# Patient Record
Sex: MALE | Race: BLACK OR AFRICAN AMERICAN | NOT HISPANIC OR LATINO | Employment: OTHER | URBAN - METROPOLITAN AREA
[De-identification: names, ages, dates, MRNs, and addresses within clinical notes are randomized per-mention and may not be internally consistent; named-entity substitution may affect disease eponyms.]

---

## 2017-05-01 ENCOUNTER — HISTORICAL (OUTPATIENT)
Dept: RADIOLOGY | Facility: HOSPITAL | Age: 72
End: 2017-05-01

## 2017-05-23 ENCOUNTER — HISTORICAL (OUTPATIENT)
Dept: RADIOLOGY | Facility: HOSPITAL | Age: 72
End: 2017-05-23

## 2017-09-29 ENCOUNTER — HISTORICAL (OUTPATIENT)
Dept: RADIOLOGY | Facility: HOSPITAL | Age: 72
End: 2017-09-29

## 2017-10-16 ENCOUNTER — HISTORICAL (OUTPATIENT)
Dept: SURGERY | Facility: HOSPITAL | Age: 72
End: 2017-10-16

## 2017-10-16 LAB
ABS NEUT (OLG): 4.4 X10(3)/MCL (ref 1.5–6.9)
APPEARANCE, UA: CLEAR
APTT PPP: 26.9 SECOND(S) (ref 25–35)
BACTERIA SPEC CULT: NORMAL /HPF
BILIRUB UR QL STRIP: NEGATIVE
BUN SERPL-MCNC: 16 MG/DL (ref 10–20)
CALCIUM SERPL-MCNC: 9.2 MG/DL (ref 8–10.5)
CHLORIDE SERPL-SCNC: 107 MMOL/L (ref 100–108)
CO2 SERPL-SCNC: 27 MMOL/L (ref 21–35)
COLOR UR: ABNORMAL
CREAT SERPL-MCNC: 1.09 MG/DL (ref 0.7–1.3)
CRP SERPL-MCNC: <0.2 MG/DL (ref 0–0.9)
ERYTHROCYTE [DISTWIDTH] IN BLOOD BY AUTOMATED COUNT: 14.1 % (ref 11.5–17)
ERYTHROCYTE [SEDIMENTATION RATE] IN BLOOD: 9 MM/HR (ref 0–15)
GLUCOSE (UA): NEGATIVE
GLUCOSE SERPL-MCNC: 104 MG/DL (ref 75–116)
HCT VFR BLD AUTO: 34.1 % (ref 42–52)
HGB BLD-MCNC: 11.4 GM/DL (ref 14–18)
HGB UR QL STRIP: ABNORMAL
INR PPP: 1 (ref 0–1.2)
KETONES UR QL STRIP: NEGATIVE
LEUKOCYTE ESTERASE UR QL STRIP: NEGATIVE
MCH RBC QN AUTO: 30 PG (ref 27–34)
MCHC RBC AUTO-ENTMCNC: 33 GM/DL (ref 31–36)
MCV RBC AUTO: 91 FL (ref 80–99)
MRSA SCREEN BY PCR: POSITIVE
NITRITE UR QL STRIP: NEGATIVE
PH UR STRIP: 5 [PH]
PLATELET # BLD AUTO: 264 X10(3)/MCL (ref 140–400)
PMV BLD AUTO: 10.6 FL (ref 6.8–10)
POTASSIUM SERPL-SCNC: 3.8 MMOL/L (ref 3.6–5.2)
PROT UR QL STRIP: NEGATIVE
PROTHROMBIN TIME: 10.9 SECOND(S) (ref 9–12)
RBC # BLD AUTO: 3.75 X10(6)/MCL (ref 4.7–6.1)
RBC #/AREA URNS HPF: NORMAL /HPF
SODIUM SERPL-SCNC: 141 MMOL/L (ref 135–145)
SP GR UR STRIP: 1.01
SQUAMOUS EPITHELIAL, UA: NORMAL
UROBILINOGEN UR STRIP-ACNC: 0.2 EU/DL
WBC # SPEC AUTO: 6.4 X10(3)/MCL (ref 4.5–11.5)
WBC #/AREA URNS HPF: NORMAL /[HPF]

## 2017-10-31 ENCOUNTER — HISTORICAL (OUTPATIENT)
Dept: RADIOLOGY | Facility: HOSPITAL | Age: 72
End: 2017-10-31

## 2018-01-19 ENCOUNTER — HISTORICAL (OUTPATIENT)
Dept: LAB | Facility: HOSPITAL | Age: 73
End: 2018-01-19

## 2018-01-19 LAB
COLOR STL: ABNORMAL
COLOR STL: NORMAL
CONSISTENCY STL: ABNORMAL
CONSISTENCY STL: NORMAL
HEMOCCULT SP1 STL QL: NEGATIVE
HEMOCCULT SP2 STL QL: POSITIVE

## 2018-01-29 ENCOUNTER — HISTORICAL (OUTPATIENT)
Dept: LAB | Facility: HOSPITAL | Age: 73
End: 2018-01-29

## 2018-01-29 LAB
ABS NEUT (OLG): 3.6 X10(3)/MCL (ref 1.5–6.9)
COLOR STL: NORMAL
COLOR STL: NORMAL
COLOR STL: YELLOW
CONSISTENCY STL: NORMAL
ERYTHROCYTE [DISTWIDTH] IN BLOOD BY AUTOMATED COUNT: 13.6 % (ref 11.5–17)
HCT VFR BLD AUTO: 34.3 % (ref 42–52)
HEMOCCULT SP1 STL QL: NEGATIVE
HEMOCCULT SP2 STL QL: NEGATIVE
HGB BLD-MCNC: 11.4 GM/DL (ref 14–18)
MCH RBC QN AUTO: 30 PG (ref 27–34)
MCHC RBC AUTO-ENTMCNC: 33 GM/DL (ref 31–36)
MCV RBC AUTO: 90 FL (ref 80–99)
PLATELET # BLD AUTO: 225 X10(3)/MCL (ref 140–400)
PMV BLD AUTO: 10.4 FL (ref 6.8–10)
RBC # BLD AUTO: 3.8 X10(6)/MCL (ref 4.7–6.1)
WBC # SPEC AUTO: 6.2 X10(3)/MCL (ref 4.5–11.5)

## 2018-02-28 ENCOUNTER — HISTORICAL (OUTPATIENT)
Dept: SURGERY | Facility: HOSPITAL | Age: 73
End: 2018-02-28

## 2018-02-28 LAB
ABS NEUT (OLG): 4.1 X10(3)/MCL (ref 1.5–6.9)
ALBUMIN SERPL-MCNC: 3.5 GM/DL (ref 3.4–5)
ALBUMIN/GLOB SERPL: 0.9 RATIO
ALP SERPL-CCNC: 69 UNIT/L (ref 30–113)
ALT SERPL-CCNC: 31 UNIT/L (ref 10–45)
APTT PPP: 26.4 SECOND(S) (ref 25–35)
AST SERPL-CCNC: 21 UNIT/L (ref 15–37)
BASOPHILS # BLD AUTO: 0 X10(3)/MCL (ref 0–0.1)
BASOPHILS NFR BLD AUTO: 0 % (ref 0–1)
BILIRUB SERPL-MCNC: 0.3 MG/DL (ref 0.1–0.9)
BILIRUBIN DIRECT+TOT PNL SERPL-MCNC: 0.1 MG/DL (ref 0–0.3)
BILIRUBIN DIRECT+TOT PNL SERPL-MCNC: 0.2 MG/DL
BUN SERPL-MCNC: 21 MG/DL (ref 10–20)
CALCIUM SERPL-MCNC: 9 MG/DL (ref 8–10.5)
CHLORIDE SERPL-SCNC: 106 MMOL/L (ref 100–108)
CO2 SERPL-SCNC: 29 MMOL/L (ref 21–35)
CREAT SERPL-MCNC: 1.14 MG/DL (ref 0.7–1.3)
EOSINOPHIL # BLD AUTO: 0.2 X10(3)/MCL (ref 0–0.6)
EOSINOPHIL NFR BLD AUTO: 3 % (ref 0–5)
ERYTHROCYTE [DISTWIDTH] IN BLOOD BY AUTOMATED COUNT: 13.2 % (ref 11.5–17)
GLOBULIN SER-MCNC: 3.8 GM/DL
GLUCOSE SERPL-MCNC: 114 MG/DL (ref 75–116)
HCT VFR BLD AUTO: 34.9 % (ref 42–52)
HGB BLD-MCNC: 11.4 GM/DL (ref 14–18)
INR PPP: 1 (ref 0–1.2)
LYMPHOCYTES # BLD AUTO: 2.1 X10(3)/MCL (ref 0.5–4.1)
LYMPHOCYTES NFR BLD AUTO: 29.8 % (ref 15–40)
MCH RBC QN AUTO: 30 PG (ref 27–34)
MCHC RBC AUTO-ENTMCNC: 33 GM/DL (ref 31–36)
MCV RBC AUTO: 90 FL (ref 80–99)
MONOCYTES # BLD AUTO: 0.5 X10(3)/MCL (ref 0–1.1)
MONOCYTES NFR BLD AUTO: 7 % (ref 4–12)
NEUTROPHILS # BLD AUTO: 4.1 X10(3)/MCL (ref 1.5–6.9)
NEUTROPHILS NFR BLD AUTO: 60 % (ref 43–75)
PLATELET # BLD AUTO: 270 X10(3)/MCL (ref 140–400)
PMV BLD AUTO: 10.6 FL (ref 6.8–10)
POTASSIUM SERPL-SCNC: 3.9 MMOL/L (ref 3.6–5.2)
PROT SERPL-MCNC: 7.3 GM/DL (ref 6.4–8.2)
PROTHROMBIN TIME: 10.5 SECOND(S) (ref 9–12)
RBC # BLD AUTO: 3.86 X10(6)/MCL (ref 4.7–6.1)
SODIUM SERPL-SCNC: 143 MMOL/L (ref 135–145)
WBC # SPEC AUTO: 6.9 X10(3)/MCL (ref 4.5–11.5)

## 2018-03-01 ENCOUNTER — HISTORICAL (OUTPATIENT)
Dept: ANESTHESIOLOGY | Facility: HOSPITAL | Age: 73
End: 2018-03-01

## 2018-03-26 ENCOUNTER — HISTORICAL (OUTPATIENT)
Dept: ANESTHESIOLOGY | Facility: HOSPITAL | Age: 73
End: 2018-03-26

## 2018-05-14 ENCOUNTER — HISTORICAL (OUTPATIENT)
Dept: RADIOLOGY | Facility: HOSPITAL | Age: 73
End: 2018-05-14

## 2019-01-22 ENCOUNTER — HISTORICAL (OUTPATIENT)
Dept: RADIOLOGY | Facility: HOSPITAL | Age: 74
End: 2019-01-22

## 2019-04-23 ENCOUNTER — HISTORICAL (OUTPATIENT)
Dept: RADIOLOGY | Facility: HOSPITAL | Age: 74
End: 2019-04-23

## 2020-05-08 ENCOUNTER — HISTORICAL (OUTPATIENT)
Dept: RADIOLOGY | Facility: HOSPITAL | Age: 75
End: 2020-05-08

## 2020-08-05 ENCOUNTER — HISTORICAL (OUTPATIENT)
Dept: LAB | Facility: HOSPITAL | Age: 75
End: 2020-08-05

## 2020-08-05 LAB
COLOR STL: NORMAL
CONSISTENCY STL: NORMAL
CONSISTENCY STL: NORMAL
HEMOCCULT SP1 STL QL: NEGATIVE
HEMOCCULT SP2 STL QL: NEGATIVE

## 2020-11-02 ENCOUNTER — HISTORICAL (OUTPATIENT)
Dept: RADIOLOGY | Facility: HOSPITAL | Age: 75
End: 2020-11-02

## 2021-03-23 ENCOUNTER — HISTORICAL (OUTPATIENT)
Dept: RADIOLOGY | Facility: HOSPITAL | Age: 76
End: 2021-03-23

## 2021-08-24 ENCOUNTER — HISTORICAL (OUTPATIENT)
Dept: RADIOLOGY | Facility: HOSPITAL | Age: 76
End: 2021-08-24

## 2021-09-07 ENCOUNTER — HISTORICAL (OUTPATIENT)
Dept: RADIOLOGY | Facility: HOSPITAL | Age: 76
End: 2021-09-07

## 2022-02-15 ENCOUNTER — HISTORICAL (OUTPATIENT)
Dept: ADMINISTRATIVE | Facility: HOSPITAL | Age: 77
End: 2022-02-15

## 2022-02-25 ENCOUNTER — HISTORICAL (OUTPATIENT)
Dept: RADIOLOGY | Facility: HOSPITAL | Age: 77
End: 2022-02-25

## 2022-02-25 ENCOUNTER — HISTORICAL (OUTPATIENT)
Dept: ADMINISTRATIVE | Facility: HOSPITAL | Age: 77
End: 2022-02-25

## 2022-03-29 ENCOUNTER — HISTORICAL (OUTPATIENT)
Dept: ADMINISTRATIVE | Facility: HOSPITAL | Age: 77
End: 2022-03-29

## 2022-03-29 ENCOUNTER — HISTORICAL (OUTPATIENT)
Dept: RADIOLOGY | Facility: HOSPITAL | Age: 77
End: 2022-03-29

## 2022-04-25 ENCOUNTER — HISTORICAL (OUTPATIENT)
Dept: ADMINISTRATIVE | Facility: HOSPITAL | Age: 77
End: 2022-04-25
Payer: MEDICARE

## 2022-04-25 ENCOUNTER — HISTORICAL (OUTPATIENT)
Dept: SURGERY | Facility: HOSPITAL | Age: 77
End: 2022-04-25
Payer: MEDICARE

## 2022-04-25 LAB
ABS NEUT (OLG): 5 (ref 1.5–6.9)
APPEARANCE, UA: CLEAR
APTT PPP: 32.6 S (ref 23.4–34.9)
BILIRUB UR QL STRIP: NEGATIVE
BUN SERPL-MCNC: 12 MG/DL (ref 8.4–25.7)
CALCIUM SERPL-MCNC: 9.6 MG/DL (ref 8.7–10.5)
CHLORIDE SERPL-SCNC: 109 MMOL/L (ref 98–107)
CO2 SERPL-SCNC: 29 MMOL/L (ref 23–31)
COLOR UR: YELLOW
CREAT SERPL-MCNC: 0.83 MG/DL (ref 0.73–1.18)
CREAT/UREA NIT SERPL: 14
CRP SERPL-MCNC: 0.21 MG/L
DO MICRO?: NO
ERYTHROCYTE [DISTWIDTH] IN BLOOD BY AUTOMATED COUNT: 13.5 % (ref 11.5–17)
ERYTHROCYTE [SEDIMENTATION RATE] IN BLOOD: 15 MM/H (ref 0–20)
GLUCOSE (UA): NEGATIVE
GLUCOSE SERPL-MCNC: 110 MG/DL (ref 82–115)
HCT VFR BLD AUTO: 35.5 % (ref 42–52)
HEMOLYSIS INTERF INDEX SERPL-ACNC: 0
HGB BLD-MCNC: 11.7 G/DL (ref 14–18)
HGB UR QL STRIP: NEGATIVE
ICTERIC INTERF INDEX SERPL-ACNC: 0
INR PPP: 1.1 (ref 2–3)
KETONES UR QL STRIP: NEGATIVE
LEUKOCYTE ESTERASE UR QL STRIP: NEGATIVE
LIPEMIC INTERF INDEX SERPL-ACNC: <0
MCH RBC QN AUTO: 31 PG (ref 27–34)
MCHC RBC AUTO-ENTMCNC: 33 G/DL (ref 31–36)
MCV RBC AUTO: 93 FL (ref 80–99)
MRSA SCREEN BY PCR: NEGATIVE
NITRITE UR QL STRIP: NEGATIVE
PH UR STRIP: 5.5 [PH] (ref 4.6–8)
PLATELET # BLD AUTO: 281 10*3/UL (ref 140–400)
PMV BLD AUTO: 10 FL (ref 6.8–10)
POTASSIUM SERPL-SCNC: 4 MMOL/L (ref 3.5–5.1)
PROBE CHECK (OHS): NORMAL
PROT UR QL STRIP: NEGATIVE
PROTHROMBIN TIME: 14.2 S (ref 11.7–14.5)
RBC # BLD AUTO: 3.82 10*6/UL (ref 4.7–6.1)
SODIUM SERPL-SCNC: 143 MMOL/L (ref 136–145)
SP GR UR STRIP: 1.01 (ref 1–1.03)
UROBILINOGEN UR STRIP-ACNC: 0.2
WBC # SPEC AUTO: 7 10*3/UL (ref 4.5–11.5)

## 2022-04-27 RX ORDER — CLOPIDOGREL BISULFATE 75 MG/1
75 TABLET ORAL DAILY
COMMUNITY

## 2022-04-27 RX ORDER — METOPROLOL TARTRATE 50 MG/1
50 TABLET ORAL 2 TIMES DAILY
COMMUNITY

## 2022-04-27 RX ORDER — NITROGLYCERIN 0.4 MG/1
0.4 TABLET SUBLINGUAL EVERY 5 MIN PRN
COMMUNITY

## 2022-04-27 RX ORDER — IRBESARTAN 300 MG/1
300 TABLET ORAL DAILY
COMMUNITY

## 2022-04-27 RX ORDER — ALLOPURINOL 300 MG/1
300 TABLET ORAL DAILY
COMMUNITY

## 2022-04-27 RX ORDER — ALBUTEROL SULFATE 90 UG/1
2 AEROSOL, METERED RESPIRATORY (INHALATION) EVERY 4 HOURS PRN
COMMUNITY

## 2022-04-27 RX ORDER — ATORVASTATIN CALCIUM 40 MG/1
40 TABLET, FILM COATED ORAL NIGHTLY
COMMUNITY

## 2022-04-27 RX ORDER — CYCLOBENZAPRINE HCL 10 MG
10 TABLET ORAL
COMMUNITY

## 2022-04-27 RX ORDER — FINASTERIDE 5 MG/1
5 TABLET, FILM COATED ORAL DAILY
COMMUNITY

## 2022-04-27 RX ORDER — HYDROCHLOROTHIAZIDE 25 MG/1
25 TABLET ORAL DAILY
COMMUNITY

## 2022-04-27 RX ORDER — ASPIRIN 325 MG
325 TABLET, DELAYED RELEASE (ENTERIC COATED) ORAL DAILY
Status: ON HOLD | COMMUNITY
End: 2022-05-05 | Stop reason: HOSPADM

## 2022-04-29 RX ORDER — CEFAZOLIN SODIUM 2 G/50ML
2 SOLUTION INTRAVENOUS
Status: DISCONTINUED | OUTPATIENT
Start: 2022-05-04 | End: 2022-04-29

## 2022-04-30 NOTE — OP NOTE
REFERRING PHYSICIAN:  Boubacar Mathews M.D.    ADMITTING DIAGNOSIS:  Iron deficiency anemia with occult positive stools.    PROCEDURE:  Esophagogastroduodenoscopy with biopsy of the antrum.    POSTOPERATIVE DIAGNOSES:    1. Normal duodenum in all 4 portions and into the jejunum.   2. Gastritis of the antrum with normal fundus and cardia of the stomach.  3. No hiatal hernia, Z-line at 40 cm.   4. Normal esophagus, cricopharyngeus muscle, vocal cords.      The patient is a 72-year-old  male with a history of iron deficiency anemia and occult positive stools.  He is status post 2 cardiac stents, has hypertension and has aortic stenosis.  He has osteoarthritis and gastroesophageal reflux disease.  The patient had a sister with colon cancer, and he himself has genital herpes.  The patient had anemia with a hemoglobin of 11, hematocrit of 34 and occult positive stools, etiology unclear.  He was referred for endoscopic evaluation.  Colonoscopy recently done on 03/01/2018 showed diverticulosis and grade 2 hemorrhoids and no other pathology.  The patient was scheduled for an EGD, brought to the GI suite, underwent sedation and examination of the esophagus, stomach, and duodenum.  Duodenum was normal in all 4 portions and into the jejunum.  The pylorus was intubated without difficulty.  Antrum, fundus, and cardia of the stomach had some mild gastritis.  There was no hiatal hernia.  Z-line at 40 cm.  The esophagus, cricopharyngeus muscle and vocal cords were normal throughout.  No other abnormalities were seen.  Overall, the patient did very well and had no problems or difficulties.         I appreciate the consultation referral from Dr. Mathews.        ERAN/LUMA   DD: 03/26/2018 1509   DT: 03/26/2018 1553  Job # [id number]    cc: Boubacar Mathews M.D.

## 2022-04-30 NOTE — OP NOTE
ADMITTING DIAGNOSES:    1. Occult positive stools with occult positive stools.  2. Mild anemia with a hemoglobin of 11, hematocrit of 34.    3. Last colonoscopy done was in September of 2013.    PROCEDURE:  Colonoscopy to the cecum with intubation of ileocecal valve.    POSTOPERATIVE DIAGNOSES:    1. Normal terminal ileum.   2. Normal colonoscopy with diverticulosis of the sigmoid colon.   3. Grade 2 internal hemorrhoids.     The patient is a 72-year-old  male with a history of cardiac stent placement, aortic stenosis, hypertension, osteoarthritis, and genital herpes, as well as iron deficiency anemia with reflux.  The patient had a family history of colon cancer, his sister had in the past.  Last colonoscopy was in 2013 and normal findings were noted.  Stools were negative for blood on followups in April of 2016, and then in December of 2017, the patient was noted to have 1 of three occult positive stools.  He had eaten some mustard grains.  He thinks it is a false positive, but because he had the positive stool, he was arranged for followup colonoscopy.    DESCRIPTION OF PROCEDURE:  The patient was brought to the GI suite, underwent sedation and a flexible Olympus scope was used to examine the colon all the way to the cecum, and intubation of the ileocecal valve.     The terminal ileum was normal up to 5 cm.  The cecum, ascending colon, transverse colon, and descending colon were normal.  There was some diverticulosis of the rectosigmoid colon, but not of any consequence.  There were grade 2 internal hemorrhoids that are probably the source of his bleeding.  No tumors, mass lesions, etc. were noted.    PLAN:    1. Follow up in the office to discuss his hemorrhoids.   2. Follow up in the office to discuss his colonoscopic results if negative.   3. Follow up in 2 years, recheck stools for blood.   4. Follow up in 10 years for repeat screening colonoscopy.     I appreciate the consultation  referral from Dr. Mathews, and will notify him of my findings.        BBS/LUMA   DD: 03/01/2018 1113   DT: 03/01/2018 1127  Job # [id number]    cc: Boubacar Mathews M.D.

## 2022-05-03 ENCOUNTER — ANESTHESIA EVENT (OUTPATIENT)
Dept: SURGERY | Facility: HOSPITAL | Age: 77
DRG: 470 | End: 2022-05-03
Payer: MEDICARE

## 2022-05-04 ENCOUNTER — HOSPITAL ENCOUNTER (INPATIENT)
Facility: HOSPITAL | Age: 77
LOS: 2 days | Discharge: HOME HEALTH | DRG: 470 | End: 2022-05-06
Attending: SPECIALIST | Admitting: SPECIALIST
Payer: MEDICARE

## 2022-05-04 ENCOUNTER — ANESTHESIA (OUTPATIENT)
Dept: SURGERY | Facility: HOSPITAL | Age: 77
DRG: 470 | End: 2022-05-04
Payer: MEDICARE

## 2022-05-04 DIAGNOSIS — M16.12 PRIMARY OSTEOARTHRITIS OF LEFT HIP: Primary | ICD-10-CM

## 2022-05-04 DIAGNOSIS — M16.9 OSTEOARTHRITIS OF HIP: ICD-10-CM

## 2022-05-04 PROCEDURE — 25000003 PHARM REV CODE 250: Performed by: PHYSICIAN ASSISTANT

## 2022-05-04 PROCEDURE — 63600175 PHARM REV CODE 636 W HCPCS: Performed by: SPECIALIST

## 2022-05-04 PROCEDURE — 63600175 PHARM REV CODE 636 W HCPCS: Performed by: ANESTHESIOLOGY

## 2022-05-04 PROCEDURE — 25000003 PHARM REV CODE 250: Performed by: NURSE ANESTHETIST, CERTIFIED REGISTERED

## 2022-05-04 PROCEDURE — 36000711: Performed by: SPECIALIST

## 2022-05-04 PROCEDURE — 63600175 PHARM REV CODE 636 W HCPCS: Performed by: PHYSICIAN ASSISTANT

## 2022-05-04 PROCEDURE — 94799 UNLISTED PULMONARY SVC/PX: CPT

## 2022-05-04 PROCEDURE — 25000003 PHARM REV CODE 250: Performed by: ANESTHESIOLOGY

## 2022-05-04 PROCEDURE — 27201423 OPTIME MED/SURG SUP & DEVICES STERILE SUPPLY: Performed by: SPECIALIST

## 2022-05-04 PROCEDURE — C1776 JOINT DEVICE (IMPLANTABLE): HCPCS | Performed by: SPECIALIST

## 2022-05-04 PROCEDURE — 97162 PT EVAL MOD COMPLEX 30 MIN: CPT

## 2022-05-04 PROCEDURE — 37000009 HC ANESTHESIA EA ADD 15 MINS: Performed by: SPECIALIST

## 2022-05-04 PROCEDURE — 99900035 HC TECH TIME PER 15 MIN (STAT)

## 2022-05-04 PROCEDURE — 25000003 PHARM REV CODE 250

## 2022-05-04 PROCEDURE — 36000710: Performed by: SPECIALIST

## 2022-05-04 PROCEDURE — 94761 N-INVAS EAR/PLS OXIMETRY MLT: CPT

## 2022-05-04 PROCEDURE — 25000003 PHARM REV CODE 250: Performed by: SPECIALIST

## 2022-05-04 PROCEDURE — 63600175 PHARM REV CODE 636 W HCPCS: Performed by: NURSE ANESTHETIST, CERTIFIED REGISTERED

## 2022-05-04 PROCEDURE — 63600175 PHARM REV CODE 636 W HCPCS

## 2022-05-04 PROCEDURE — 27000221 HC OXYGEN, UP TO 24 HOURS

## 2022-05-04 PROCEDURE — 11000001 HC ACUTE MED/SURG PRIVATE ROOM

## 2022-05-04 PROCEDURE — 88304 TISSUE EXAM BY PATHOLOGIST: CPT | Performed by: SPECIALIST

## 2022-05-04 PROCEDURE — 37000008 HC ANESTHESIA 1ST 15 MINUTES: Performed by: SPECIALIST

## 2022-05-04 PROCEDURE — 71000033 HC RECOVERY, INTIAL HOUR: Performed by: SPECIALIST

## 2022-05-04 DEVICE — CUP ACT PINNACLE SECTOR 56 TIT: Type: IMPLANTABLE DEVICE | Site: HIP | Status: FUNCTIONAL

## 2022-05-04 DEVICE — STEM ACTIS FEM COLLARED HIGH 4: Type: IMPLANTABLE DEVICE | Site: HIP | Status: FUNCTIONAL

## 2022-05-04 DEVICE — IMPLANTABLE DEVICE: Type: IMPLANTABLE DEVICE | Site: HIP | Status: FUNCTIONAL

## 2022-05-04 RX ORDER — BUPIVACAINE HYDROCHLORIDE 2.5 MG/ML
INJECTION, SOLUTION EPIDURAL; INFILTRATION; INTRACAUDAL
Status: COMPLETED
Start: 2022-05-04 | End: 2022-05-04

## 2022-05-04 RX ORDER — LIDOCAINE HYDROCHLORIDE 10 MG/ML
1 INJECTION, SOLUTION EPIDURAL; INFILTRATION; INTRACAUDAL; PERINEURAL ONCE
Status: DISCONTINUED | OUTPATIENT
Start: 2022-05-04 | End: 2022-05-04

## 2022-05-04 RX ORDER — MORPHINE SULFATE 0.5 MG/ML
INJECTION, SOLUTION EPIDURAL; INTRATHECAL; INTRAVENOUS
Status: DISCONTINUED | OUTPATIENT
Start: 2022-05-04 | End: 2022-05-04

## 2022-05-04 RX ORDER — SODIUM CHLORIDE, SODIUM LACTATE, POTASSIUM CHLORIDE, CALCIUM CHLORIDE 600; 310; 30; 20 MG/100ML; MG/100ML; MG/100ML; MG/100ML
INJECTION, SOLUTION INTRAVENOUS CONTINUOUS
Status: DISCONTINUED | OUTPATIENT
Start: 2022-05-04 | End: 2022-05-04

## 2022-05-04 RX ORDER — HYDROCHLOROTHIAZIDE 25 MG/1
25 TABLET ORAL DAILY
Status: DISCONTINUED | OUTPATIENT
Start: 2022-05-04 | End: 2022-05-06 | Stop reason: HOSPADM

## 2022-05-04 RX ORDER — GABAPENTIN 300 MG/1
300 CAPSULE ORAL
Status: COMPLETED | OUTPATIENT
Start: 2022-05-04 | End: 2022-05-04

## 2022-05-04 RX ORDER — ALBUTEROL SULFATE 90 UG/1
2 AEROSOL, METERED RESPIRATORY (INHALATION) EVERY 4 HOURS PRN
Status: DISCONTINUED | OUTPATIENT
Start: 2022-05-04 | End: 2022-05-06 | Stop reason: HOSPADM

## 2022-05-04 RX ORDER — FINASTERIDE 5 MG/1
5 TABLET, FILM COATED ORAL DAILY
Status: DISCONTINUED | OUTPATIENT
Start: 2022-05-04 | End: 2022-05-06 | Stop reason: HOSPADM

## 2022-05-04 RX ORDER — CEFAZOLIN SODIUM IN 0.9 % NACL 2 G/100 ML
PLASTIC BAG, INJECTION (ML) INTRAVENOUS
Status: DISCONTINUED | OUTPATIENT
Start: 2022-05-04 | End: 2022-05-04

## 2022-05-04 RX ORDER — CEFAZOLIN SODIUM 1 G/3ML
INJECTION, POWDER, FOR SOLUTION INTRAMUSCULAR; INTRAVENOUS
Status: COMPLETED
Start: 2022-05-04 | End: 2022-05-04

## 2022-05-04 RX ORDER — BISACODYL 10 MG
10 SUPPOSITORY, RECTAL RECTAL DAILY PRN
Status: DISCONTINUED | OUTPATIENT
Start: 2022-05-04 | End: 2022-05-06 | Stop reason: HOSPADM

## 2022-05-04 RX ORDER — ASPIRIN 325 MG
325 TABLET, DELAYED RELEASE (ENTERIC COATED) ORAL DAILY
Status: DISCONTINUED | OUTPATIENT
Start: 2022-05-05 | End: 2022-05-06 | Stop reason: HOSPADM

## 2022-05-04 RX ORDER — AMOXICILLIN 250 MG
1 CAPSULE ORAL 2 TIMES DAILY
Status: DISCONTINUED | OUTPATIENT
Start: 2022-05-04 | End: 2022-05-06 | Stop reason: HOSPADM

## 2022-05-04 RX ORDER — CEFAZOLIN SODIUM 2 G/50ML
2 SOLUTION INTRAVENOUS
Status: DISCONTINUED | OUTPATIENT
Start: 2022-05-04 | End: 2022-05-04

## 2022-05-04 RX ORDER — ATORVASTATIN CALCIUM 40 MG/1
40 TABLET, FILM COATED ORAL NIGHTLY
Status: DISCONTINUED | OUTPATIENT
Start: 2022-05-04 | End: 2022-05-06 | Stop reason: HOSPADM

## 2022-05-04 RX ORDER — TRAMADOL HYDROCHLORIDE 50 MG/1
50 TABLET ORAL EVERY 6 HOURS PRN
Status: DISCONTINUED | OUTPATIENT
Start: 2022-05-04 | End: 2022-05-06 | Stop reason: HOSPADM

## 2022-05-04 RX ORDER — OXYCODONE HYDROCHLORIDE 5 MG/1
10 TABLET ORAL EVERY 6 HOURS PRN
Status: DISCONTINUED | OUTPATIENT
Start: 2022-05-04 | End: 2022-05-06 | Stop reason: HOSPADM

## 2022-05-04 RX ORDER — NITROGLYCERIN 0.4 MG/1
0.4 TABLET SUBLINGUAL EVERY 5 MIN PRN
Status: DISCONTINUED | OUTPATIENT
Start: 2022-05-04 | End: 2022-05-06 | Stop reason: HOSPADM

## 2022-05-04 RX ORDER — METOPROLOL TARTRATE 50 MG/1
50 TABLET ORAL 2 TIMES DAILY
Status: DISCONTINUED | OUTPATIENT
Start: 2022-05-04 | End: 2022-05-06 | Stop reason: HOSPADM

## 2022-05-04 RX ORDER — LOSARTAN POTASSIUM 50 MG/1
100 TABLET ORAL DAILY
Status: DISCONTINUED | OUTPATIENT
Start: 2022-05-04 | End: 2022-05-06 | Stop reason: HOSPADM

## 2022-05-04 RX ORDER — MORPHINE SULFATE 10 MG/ML
4 INJECTION INTRAMUSCULAR; INTRAVENOUS; SUBCUTANEOUS
Status: DISCONTINUED | OUTPATIENT
Start: 2022-05-04 | End: 2022-05-06 | Stop reason: HOSPADM

## 2022-05-04 RX ORDER — CLOPIDOGREL BISULFATE 75 MG/1
75 TABLET ORAL DAILY
Status: DISCONTINUED | OUTPATIENT
Start: 2022-05-05 | End: 2022-05-06 | Stop reason: HOSPADM

## 2022-05-04 RX ORDER — SODIUM CHLORIDE 0.9 % (FLUSH) 0.9 %
10 SYRINGE (ML) INJECTION
Status: ACTIVE | OUTPATIENT
Start: 2022-05-04

## 2022-05-04 RX ORDER — FENTANYL CITRATE 50 UG/ML
INJECTION, SOLUTION INTRAMUSCULAR; INTRAVENOUS
Status: DISCONTINUED | OUTPATIENT
Start: 2022-05-04 | End: 2022-05-04

## 2022-05-04 RX ORDER — TRANEXAMIC ACID 100 MG/ML
INJECTION, SOLUTION INTRAVENOUS
Status: DISCONTINUED | OUTPATIENT
Start: 2022-05-04 | End: 2022-05-04

## 2022-05-04 RX ORDER — CEFAZOLIN SODIUM 2 G/50ML
2 SOLUTION INTRAVENOUS
Status: COMPLETED | OUTPATIENT
Start: 2022-05-04 | End: 2022-05-04

## 2022-05-04 RX ORDER — BUPIVACAINE HYDROCHLORIDE 7.5 MG/ML
INJECTION, SOLUTION EPIDURAL; RETROBULBAR
Status: COMPLETED | OUTPATIENT
Start: 2022-05-04 | End: 2022-05-04

## 2022-05-04 RX ORDER — ONDANSETRON 2 MG/ML
INJECTION INTRAMUSCULAR; INTRAVENOUS
Status: DISCONTINUED | OUTPATIENT
Start: 2022-05-04 | End: 2022-05-04

## 2022-05-04 RX ORDER — PROMETHAZINE HYDROCHLORIDE 25 MG/1
25 TABLET ORAL EVERY 6 HOURS PRN
Status: DISCONTINUED | OUTPATIENT
Start: 2022-05-04 | End: 2022-05-06 | Stop reason: HOSPADM

## 2022-05-04 RX ORDER — MIDAZOLAM HYDROCHLORIDE 1 MG/ML
INJECTION INTRAMUSCULAR; INTRAVENOUS
Status: DISCONTINUED | OUTPATIENT
Start: 2022-05-04 | End: 2022-05-04

## 2022-05-04 RX ORDER — ALLOPURINOL 300 MG/1
300 TABLET ORAL DAILY
Status: DISCONTINUED | OUTPATIENT
Start: 2022-05-04 | End: 2022-05-06 | Stop reason: HOSPADM

## 2022-05-04 RX ORDER — SODIUM CHLORIDE, SODIUM LACTATE, POTASSIUM CHLORIDE, CALCIUM CHLORIDE 600; 310; 30; 20 MG/100ML; MG/100ML; MG/100ML; MG/100ML
INJECTION, SOLUTION INTRAVENOUS CONTINUOUS
Status: DISCONTINUED | OUTPATIENT
Start: 2022-05-04 | End: 2022-05-06 | Stop reason: HOSPADM

## 2022-05-04 RX ORDER — SODIUM CHLORIDE 0.9 % (FLUSH) 0.9 %
10 SYRINGE (ML) INJECTION
Status: DISCONTINUED | OUTPATIENT
Start: 2022-05-04 | End: 2022-05-06 | Stop reason: HOSPADM

## 2022-05-04 RX ORDER — ACETAMINOPHEN 325 MG/1
650 TABLET ORAL ONCE
Status: COMPLETED | OUTPATIENT
Start: 2022-05-04 | End: 2022-05-04

## 2022-05-04 RX ORDER — KETOROLAC TROMETHAMINE 30 MG/ML
15 INJECTION, SOLUTION INTRAMUSCULAR; INTRAVENOUS EVERY 6 HOURS PRN
Status: DISCONTINUED | OUTPATIENT
Start: 2022-05-04 | End: 2022-05-06 | Stop reason: HOSPADM

## 2022-05-04 RX ORDER — CYCLOBENZAPRINE HCL 10 MG
10 TABLET ORAL 3 TIMES DAILY
Status: DISCONTINUED | OUTPATIENT
Start: 2022-05-04 | End: 2022-05-06 | Stop reason: HOSPADM

## 2022-05-04 RX ORDER — ONDANSETRON 2 MG/ML
4 INJECTION INTRAMUSCULAR; INTRAVENOUS EVERY 12 HOURS PRN
Status: DISCONTINUED | OUTPATIENT
Start: 2022-05-04 | End: 2022-05-06 | Stop reason: HOSPADM

## 2022-05-04 RX ORDER — TALC
6 POWDER (GRAM) TOPICAL NIGHTLY PRN
Status: DISCONTINUED | OUTPATIENT
Start: 2022-05-04 | End: 2022-05-06 | Stop reason: HOSPADM

## 2022-05-04 RX ADMIN — ALLOPURINOL 300 MG: 300 TABLET ORAL at 04:05

## 2022-05-04 RX ADMIN — SODIUM CHLORIDE, POTASSIUM CHLORIDE, SODIUM LACTATE AND CALCIUM CHLORIDE: 600; 310; 30; 20 INJECTION, SOLUTION INTRAVENOUS at 11:05

## 2022-05-04 RX ADMIN — MIDAZOLAM HYDROCHLORIDE 2 MG: 1 INJECTION, SOLUTION INTRAMUSCULAR; INTRAVENOUS at 10:05

## 2022-05-04 RX ADMIN — LOSARTAN POTASSIUM 100 MG: 50 TABLET, FILM COATED ORAL at 04:05

## 2022-05-04 RX ADMIN — HYDROCHLOROTHIAZIDE 25 MG: 25 TABLET ORAL at 04:05

## 2022-05-04 RX ADMIN — ATORVASTATIN CALCIUM 40 MG: 40 TABLET, FILM COATED ORAL at 09:05

## 2022-05-04 RX ADMIN — ACETAMINOPHEN 650 MG: 325 TABLET ORAL at 09:05

## 2022-05-04 RX ADMIN — KETOROLAC TROMETHAMINE 15 MG: 30 INJECTION, SOLUTION INTRAMUSCULAR; INTRAVENOUS at 09:05

## 2022-05-04 RX ADMIN — METOPROLOL TARTRATE 50 MG: 50 TABLET, FILM COATED ORAL at 09:05

## 2022-05-04 RX ADMIN — CYCLOBENZAPRINE 10 MG: 10 TABLET, FILM COATED ORAL at 09:05

## 2022-05-04 RX ADMIN — FINASTERIDE 5 MG: 5 TABLET, FILM COATED ORAL at 04:05

## 2022-05-04 RX ADMIN — Medication 2 G: at 10:05

## 2022-05-04 RX ADMIN — BUPIVACAINE HYDROCHLORIDE 1.6 ML: 7.5 INJECTION, SOLUTION EPIDURAL; RETROBULBAR at 09:05

## 2022-05-04 RX ADMIN — TRANEXAMIC ACID 1000 MG: 100 INJECTION, SOLUTION INTRAVENOUS at 11:05

## 2022-05-04 RX ADMIN — MIDAZOLAM HYDROCHLORIDE 2 MG: 1 INJECTION, SOLUTION INTRAMUSCULAR; INTRAVENOUS at 09:05

## 2022-05-04 RX ADMIN — TRANEXAMIC ACID 1000 MG: 100 INJECTION, SOLUTION INTRAVENOUS at 10:05

## 2022-05-04 RX ADMIN — SENNOSIDES, DOCUSATE SODIUM 1 TABLET: 8.6; 5 TABLET ORAL at 09:05

## 2022-05-04 RX ADMIN — SODIUM CHLORIDE, POTASSIUM CHLORIDE, SODIUM LACTATE AND CALCIUM CHLORIDE: 600; 310; 30; 20 INJECTION, SOLUTION INTRAVENOUS at 06:05

## 2022-05-04 RX ADMIN — ONDANSETRON 4 MG: 2 INJECTION INTRAMUSCULAR; INTRAVENOUS at 10:05

## 2022-05-04 RX ADMIN — SODIUM CHLORIDE, POTASSIUM CHLORIDE, SODIUM LACTATE AND CALCIUM CHLORIDE: 600; 310; 30; 20 INJECTION, SOLUTION INTRAVENOUS at 04:05

## 2022-05-04 RX ADMIN — CYCLOBENZAPRINE 10 MG: 10 TABLET, FILM COATED ORAL at 04:05

## 2022-05-04 RX ADMIN — FENTANYL CITRATE 100 MCG: 50 INJECTION, SOLUTION INTRAMUSCULAR; INTRAVENOUS at 09:05

## 2022-05-04 RX ADMIN — CEFAZOLIN SODIUM 2 G: 2 SOLUTION INTRAVENOUS at 09:05

## 2022-05-04 RX ADMIN — GABAPENTIN 300 MG: 300 CAPSULE ORAL at 06:05

## 2022-05-04 RX ADMIN — MORPHINE SULFATE 0.15 MG: 0.5 INJECTION, SOLUTION EPIDURAL; INTRATHECAL; INTRAVENOUS at 09:05

## 2022-05-04 RX ADMIN — CEFAZOLIN SODIUM 2 G: 2 SOLUTION INTRAVENOUS at 04:05

## 2022-05-04 NOTE — TRANSFER OF CARE
"Anesthesia Transfer of Care Note    Patient: Mahamed Jose    Procedure(s) Performed: Procedure(s) (LRB):  ARTHROPLASTY, HIP, TOTAL, ANTERIOR APPROACH (Left)    Patient location: PACU    Anesthesia Type: spinal    Transport from OR: Transported from OR on room air with adequate spontaneous ventilation    Post pain: adequate analgesia    Post assessment: no apparent anesthetic complications    Post vital signs: stable    Level of consciousness: awake and alert    Nausea/Vomiting: no nausea/vomiting    Complications: none    Transfer of care protocol was followed      Last vitals:   Visit Vitals  /75   Pulse 72   Temp 36.8 °C (98.2 °F) (Tympanic)   Resp 20   Ht 5' 6.97" (1.701 m)   Wt 85.7 kg (188 lb 15 oz)   SpO2 100%   BMI 29.62 kg/m²     "

## 2022-05-04 NOTE — ANESTHESIA PREPROCEDURE EVALUATION
05/04/2022  Mahamed Jose is a 76 y.o., male.      Pre-op Assessment    I have reviewed the Patient Summary Reports.     I have reviewed the Nursing Notes. I have reviewed the NPO Status.      Review of Systems  Anesthesia Hx:  No problems with previous Anesthesia Denies Hx of Anesthetic complications  Denies Family Hx of Anesthesia complications.   Denies Personal Hx of Anesthesia complications.   Social:  Former Smoker    Hematology/Oncology:         -- Anemia:   Cardiovascular:   Exercise tolerance: good CABG/stent ECG has been reviewed. H/o PVD, h/o Carotid Disease.     H/o Coronary stints, foloowed by Franklyn Gates MD(CIS) Functional Capacity good / => 4 METS  Coronary Artery Disease:  hx of Percutaneous Coronary Intervention (PCI), patient hx of diagnosis.  Carotoid Artery Disease, bilateral  Hypertension    Pulmonary:   COPD    Renal/:  Renal/ Normal     Hepatic/GI:   GERD    Musculoskeletal:   Arthritis     Neurological:   CVA    Endocrine:  Endocrine Normal    Dermatological:  Skin Normal    Psych:  Psychiatric Normal           Physical Exam  General: Cooperative, Alert and Oriented    Airway:  Mallampati: II   Mouth Opening: Normal  TM Distance: Normal  Neck ROM: Normal ROM  Pre-Existing Airway: Oral Endotracheal tube    Dental:  Intact    Chest/Lungs:  Clear to auscultation, Normal Respiratory Rate    Heart:  Rate: Normal  Rhythm: Regular Rhythm  Sounds: Normal        Anesthesia Plan  Type of Anesthesia, risks & benefits discussed:    Anesthesia Type: Spinal  Intra-op Monitoring Plan: Standard ASA Monitors  Post Op Pain Control Plan: multimodal analgesia  Informed Consent: Informed consent signed with the Patient and all parties understand the risks and agree with anesthesia plan.  All questions answered. Patient consented to blood products? Yes  ASA Score: 3  Anesthesia Plan Notes: 77 yo  M sched for Left REGINA Anterior approach  ASA III: CAD/with Stints( followed by Dr Yajaira JOSE),h/o CVA with Carotid disease, former smoker(COPD), GERD, OA.** Last took Plavix 4/28**  Plan: SAB/ERAS/intrathecal opioids    Ready For Surgery From Anesthesia Perspective.     .

## 2022-05-04 NOTE — ANESTHESIA RELEASE NOTE
"Anesthesia Release from PACU Note    Patient: Mahamed Jose    Procedure(s) Performed: Procedure(s) (LRB):  ARTHROPLASTY, HIP, TOTAL, ANTERIOR APPROACH (Left)    Anesthesia type: spinal    Post pain: Adequate analgesia    Post assessment: no apparent anesthetic complications    Last Vitals:   Visit Vitals  BP (!) 155/57   Pulse (!) 54   Temp 36.8 °C (98.2 °F) (Tympanic)   Resp 15   Ht 5' 6.97" (1.701 m)   Wt 85.7 kg (188 lb 15 oz)   SpO2 (!) 94%   BMI 29.62 kg/m²       Post vital signs: stable    Level of consciousness: awake and alert     Nausea/Vomiting: no nausea/no vomiting    Complications: none    Airway Patency: patent    Respiratory: unassisted    Cardiovascular: stable and blood pressure at baseline    Hydration: euvolemic  "

## 2022-05-04 NOTE — ANESTHESIA POSTPROCEDURE EVALUATION
Anesthesia Post Evaluation    Patient: Mahamed Jose    Procedure(s) Performed: Procedure(s) (LRB):  ARTHROPLASTY, HIP, TOTAL, ANTERIOR APPROACH (Left)    Final Anesthesia Type: spinal      Patient location during evaluation: PACU  Patient participation: Yes- Able to Participate  Level of consciousness: awake and alert and oriented  Post-procedure vital signs: reviewed and stable  Pain management: adequate  Airway patency: patent    PONV status at discharge: No PONV  Anesthetic complications: no      Cardiovascular status: blood pressure returned to baseline and stable  Respiratory status: unassisted and face mask  Hydration status: euvolemic  Follow-up not needed.  Comments: Resolving SAB          Vitals Value Taken Time   /57 05/04/22 1226   Temp 36.8 °C (98.2 °F) 05/04/22 1203   Pulse 58 05/04/22 1227   Resp 16 05/04/22 1227   SpO2 96 % 05/04/22 1227   Vitals shown include unvalidated device data.      No case tracking events are documented in the log.      Pain/Grabiel Score: Grabiel Score: 8 (5/4/2022 12:24 PM)

## 2022-05-04 NOTE — PT/OT/SLP EVAL
Physical Therapy Evaluation    Patient Name:  Mahamed Jose   MRN:  [id number]    Recommendations:     Discharge Recommendations:  home health PT, home   Discharge Equipment Recommendations: walker, rolling   Barriers to discharge: None    Assessment:     Mahamed Jose is a 76 y.o. male admitted with a medical diagnosis of Primary osteoarthritis of left hip.  He presents with the following impairments/functional limitations:  weakness, gait instability . Patient is s/p L TOMASZ today by Dr Lemos, anterior approach, PWB.    Rehab Prognosis: Good; patient would benefit from acute skilled PT services to address these deficits and reach maximum level of function.    Recent Surgery: Procedure(s) (LRB):  ARTHROPLASTY, HIP, TOTAL, ANTERIOR APPROACH (Left) Day of Surgery    Plan:     During this hospitalization, patient to be seen daily (BID MON-FRI, daily SAT-SUN) to address the identified rehab impairments via gait training, therapeutic activities, therapeutic exercises and progress toward the following goals:    · Plan of Care Expires:  06/01/22    Subjective     Chief Complaint:weakness, pain  Patient/Family Comments/goals: Patient wanting to return home to active lifestyle.  Pain/Comfort:  · Pain Rating 1: 3/10  · Location - Side 1: Left  · Location - Orientation 1: lower  · Location 1: hip  · Pain Addressed 1: Reposition, Nurse notified  · Pain Rating Post-Intervention 1: 4/10    Patients cultural, spiritual, Orthodoxy conflicts given the current situation:      Living Environment:  Single level home, threshold step to enter, lives with wife and grandchiled.  Prior to admission, patients level of function was Indep.  Equipment used at home: none.  DME owned (not currently used): rolling walker, bedside commode and wheelchair.  Upon discharge, patient will have assistance from spouse.    Objective:     Communicated with nurse prior to session.  Patient found supine with blood pressure cuff,  hemovac, hip abduction pillow, peripheral IV, chadwick catheter, oxygen  upon PT entry to room.    General Precautions: Standard, fall   Orthopedic Precautions:LLE partial weight bearing, LLE anterior precautions   Braces:    Respiratory Status: Nasal cannula, flow 2 L/min       Exams:  · RLE ROM: WNL  · RLE Strength: WNL  · LLE ROM: Deficits: limited by surgery  · LLE Strength: Deficits: grossly 3-/5    Functional Mobility:  · Bed Mobility:     · Supine to Sit: minimum assistance  · Sit to Supine: minimum assistance  · Transfers:     · Sit to Stand:  minimum assistance with rolling walker  · Bed to Chair: minimum assistance with  rolling walker  using  Step Transfer  · Gait: 3' with RW: Min A cuing for PWB LLE    Therapeutic Activities and Exercises:   see above, evaluation completed, precautions reviewed.    AM-PAC 6 CLICK MOBILITY  Total Score:      Patient left up in chair with all lines intact, call button in reach and charge nurse, nurse and aide notified.    GOALS:   Multidisciplinary Problems     Physical Therapy Goals        Problem: Physical Therapy    Goal Priority Disciplines Outcome Goal Variances Interventions   Physical Therapy Goal     PT, PT/OT Ongoing, Progressing     Description: Goals to be met by:22    Patient will increase functional independence with mobility by performin. Supine to sit with Modified [place]  2. Sit to stand transfer with Modified [place]  3. Bed to chair transfer with Supervision using Rolling Walker  4.. Gait  x 75' feet with Supervision using Rolling Walker with PWB LLE.                      History:     Past Medical History:   Diagnosis Date    Anemia, unspecified     Carotid artery stenosis     Gastro-esophageal reflux disease without esophagitis     Peripheral artery disease     Stroke        Past Surgical History:   Procedure Laterality Date    BACK SURGERY      CAROTID ENDARTERECTOMY Left 2015    COLONOSCOPY N/A 2018    CORONARY  STENT PLACEMENT N/A     x 2    ESOPHAGOGASTRODUODENOSCOPY  03/26/2018    PENILE PROSTHESIS IMPLANT N/A     ROTATOR CUFF REPAIR Right     TOTAL KNEE ARTHROPLASTY Right 10/18/2017       Time Tracking:     PT Received On: 05/04/22  PT Start Time: 1610     PT Stop Time: 1635  PT Total Time (min): 25 min     Billable Minutes: Evaluation 25 and Total Time .  05/04/2022

## 2022-05-04 NOTE — ANESTHESIA PROCEDURE NOTES
HPI     Last eye exam was 1/25/22 with Dr. Lombardi.  Patient states very happy with SCL's that she already order her supply   from [employer]. Vision fluctuates day to day. Reads every day and feels OS   SCL could be stronger. Removes them at night and can see the tv better.     Refresh prn OU    Last edited by Sissy Medina MA on 2/17/2022  1:53 PM. (History)            Assessment /Plan     For exam results, see Encounter Report.    Hyperopia with astigmatism and presbyopia, bilateral    Wears contact lenses          1-2.  Patient happy with contact lens rx.  Updated reading glasses to wear over contacts.  RTC 1 year for routine exam with ON OCT.                      Spinal    Diagnosis: Left hip arthritis  Patient location during procedure: holding area  End time: 5/4/2022 9:42 AM    Staffing  Authorizing Provider: Carlito Rosenberg CRNA  Performing Provider: Carlito Rosenberg CRNA    Preanesthetic Checklist  Completed: patient identified, IV checked, site marked, risks and benefits discussed, surgical consent, monitors and equipment checked, pre-op evaluation and timeout performed  Spinal Block  Patient position: sitting  Prep: Betadine  Patient monitoring: heart rate, cardiac monitor, continuous pulse ox and frequent blood pressure checks  Approach: midline  Location: L2-3  Injection technique: single shot  CSF Fluid: clear free-flowing CSF  Needle  Needle type: pencil-tip   Needle gauge: 24 G  Needle length: 3.5 in  Additional Documentation: no paresthesia on injection and negative aspiration for heme  Needle localization: anatomical landmarks  Assessment   Dermatomal levels determined by pinch or prick  Ease of block: easy  Patient's tolerance of the procedure: comfortable throughout block and no complaints  Medications:    Medications: bupivacaine (pf) (MARCAINE) injection 0.75% - Intraspinal   1.6 mL - 5/4/2022 9:42:00 AM

## 2022-05-04 NOTE — INTERVAL H&P NOTE
The patient has been examined and the H&P has been reviewed:    I concur with the findings and no changes have occurred since H&P was written.    Surgery risks, benefits and alternative options discussed and understood by patient/family.          Active Hospital Problems    Diagnosis  POA    *Primary osteoarthritis of left hip [M16.12]  Yes      Resolved Hospital Problems   No resolved problems to display.

## 2022-05-04 NOTE — H&P
There has been no change since patient's last physical exam. He will undergo left total hip arthroplasty, anterior approach, due to left hip osteoarthritis.

## 2022-05-04 NOTE — OP NOTE
Operative note    Preop diagnosis:  Osteoarthritis left hip.    Procedure:  Left total hip arthroplasty, anterior approach    Implants:  Dip you pedicle acetabular cup size 56 diameter, Actis as capital A-C TIS duo fix size 4 high offset collar femoral stem, Biolox Delta ceramic head 40 mm diameter +5.  Crosslink poly AltrX liner neutral.    Surgeon:  Zach Lemos MD    Assistant:  nona Liu    Anesthesia:  Spinal    Blood loss:  200 cc    Complications:  None    Procedure in detail:  The patient was brought to the operating room after having a spinal administered.  He was given IV antibiotics and IV TXA.  He was placed on the [place] table and preoperative imaging was utilized.  We looked at preoperative leg length as well as preoperative offset.  Next is left anterior thigh was marked with a surgical marker.  A 7 cm incision was made originating 2 cm lateral and distal to the ASIS.  It extended anterolaterally 7 cm over the thigh.  He was then prepped and draped.  An incision was made anterolateral on the thigh.  A subcutaneous soft tissue retractor was positioned.  The fascia was incised and tagged.  The interval between the tensor fascial miki and the sartorius was developed with digital palpation.  A Cobra retractor was positioned over the superior femoral neck.  A 2nd Cobra retractor was positioned over the inferior femoral neck.  A 90 degree Hohmann was positioned to expose anterior circumflex vessels which were ligated with the Bovie.  Next a 90 degree Hohmann was placed anteriorly over the acetabulum and the calcified labrum was excised.  A capsulectomy was performed.  Following this the Cobra retractors were repositioned directly onto the femoral neck superiorly and inferiorly.  Using fluoro and a marking alfredo I marked the resection level on the femoral neck.  Once this was done I used an oscillating saw to cut the femoral neck in the appropriate position.  Gentle traction was applied to allow some  distraction once the cut was completed.  Next the leg was externally rotated and a corkscrew was inserted into the femoral head and neck.  I was able to remove the femoral head with minimal soft tissue trauma.  Next the acetabulum was exposed with retractors medially and laterally as well as a 90 degree Homans anteriorly.  I reamed the acetabulum down to the teardrop using fluoro navigation for accuracy.  Once this was done sequentially reamed up to a size 55 mm.  And using the Reamer and joint point navigation positioned his cup at an inclination angle of approximately 43° and anteversion of approximately 23 degrees.  Following this the final cup was impacted firmly down to bone with good purchase.  Following this a neutral liner was carefully positioned with the tabs visualized and impacted down into the cup.  It was checked with the money in around the periphery and 3 tabs confirmed it was completely seated and secure.  Following this the femur was exposed a medial calcar retractor was positioned and a Terry capital AMA TT a retractor was placed behind the greater trochanter.  The left leg was a deducted externally rotated and lowered toward the floor for exposure to the proximal femur.  And x-rayed box chisel was used followed by hand rasp to advance down the femoral canal and this was confirmed with C-arm prior to broaching.  I used an all broach technique starting with a very small broach and sequentially broaching up to a broach that was secure and snug.  His final broach size was 4.  I went through a trialing process and reduced the hip with a +5 neck and used navigation to confirm position stability.  Leg lengths were appropriate.  Stability was checked by externally rotating the leg gradually up to 120° which was firm and no anterior instability.  Following this the leg was dislocated and the final stem was impacted with appropriate position.  A ceramic head was utilized and impacted over a dry trunnion.   The hip was reduced a final time and offset position and leg length were confirmed once again.  Following this everything was Pulsavac lavaged and dried.  Cautery was used as well as a laser to create a dry wound surgical field.  The wound was then closed over a drain with a running 1. On the fascia subcuticular and Exofin glue on the skin.  There were no complications.

## 2022-05-05 ENCOUNTER — ANESTHESIA (OUTPATIENT)
Dept: MEDSURG UNIT | Facility: HOSPITAL | Age: 77
End: 2022-05-05

## 2022-05-05 ENCOUNTER — ANESTHESIA EVENT (OUTPATIENT)
Dept: MEDSURG UNIT | Facility: HOSPITAL | Age: 77
End: 2022-05-05

## 2022-05-05 PROBLEM — M16.12 PRIMARY OSTEOARTHRITIS OF LEFT HIP: Status: RESOLVED | Noted: 2022-05-04 | Resolved: 2022-05-05

## 2022-05-05 LAB
ANION GAP SERPL CALC-SCNC: 8 MEQ/L
BUN SERPL-MCNC: 18 MG/DL (ref 8.4–25.7)
CALCIUM SERPL-MCNC: 8.3 MG/DL (ref 8.8–10)
CHLORIDE SERPL-SCNC: 104 MMOL/L (ref 98–107)
CO2 SERPL-SCNC: 25 MMOL/L (ref 23–31)
CREAT SERPL-MCNC: 1.1 MG/DL (ref 0.73–1.18)
CREAT/UREA NIT SERPL: 16
GLUCOSE SERPL-MCNC: 112 MG/DL (ref 82–115)
HCT VFR BLD AUTO: 27.1 % (ref 42–52)
HGB BLD-MCNC: 8.9 GM/DL (ref 14–18)
POTASSIUM SERPL-SCNC: 4.1 MMOL/L (ref 3.5–5.1)
SODIUM SERPL-SCNC: 137 MMOL/L (ref 136–145)

## 2022-05-05 PROCEDURE — 94761 N-INVAS EAR/PLS OXIMETRY MLT: CPT

## 2022-05-05 PROCEDURE — 97530 THERAPEUTIC ACTIVITIES: CPT

## 2022-05-05 PROCEDURE — 85014 HEMATOCRIT: CPT | Performed by: PHYSICIAN ASSISTANT

## 2022-05-05 PROCEDURE — 80048 BASIC METABOLIC PNL TOTAL CA: CPT | Performed by: PHYSICIAN ASSISTANT

## 2022-05-05 PROCEDURE — 25000003 PHARM REV CODE 250: Performed by: PHYSICIAN ASSISTANT

## 2022-05-05 PROCEDURE — 94799 UNLISTED PULMONARY SVC/PX: CPT

## 2022-05-05 PROCEDURE — 11000001 HC ACUTE MED/SURG PRIVATE ROOM

## 2022-05-05 PROCEDURE — 36415 COLL VENOUS BLD VENIPUNCTURE: CPT | Performed by: PHYSICIAN ASSISTANT

## 2022-05-05 RX ORDER — KETOROLAC TROMETHAMINE 10 MG/1
10 TABLET, FILM COATED ORAL EVERY 6 HOURS
Qty: 20 TABLET | Refills: 0 | Status: SHIPPED | OUTPATIENT
Start: 2022-05-05 | End: 2022-05-10

## 2022-05-05 RX ORDER — ASPIRIN 325 MG
325 TABLET, DELAYED RELEASE (ENTERIC COATED) ORAL 2 TIMES DAILY
Qty: 84 TABLET | Refills: 0 | Status: SHIPPED | OUTPATIENT
Start: 2022-05-05 | End: 2023-05-05

## 2022-05-05 RX ORDER — HYDROCODONE BITARTRATE AND ACETAMINOPHEN 10; 325 MG/1; MG/1
1 TABLET ORAL EVERY 6 HOURS PRN
Qty: 28 TABLET | Refills: 0 | Status: SHIPPED | OUTPATIENT
Start: 2022-05-05

## 2022-05-05 RX ORDER — CEFADROXIL 500 MG/1
1 CAPSULE ORAL EVERY 12 HOURS
Qty: 14 CAPSULE | Refills: 0 | Status: SHIPPED | OUTPATIENT
Start: 2022-05-05 | End: 2022-05-12

## 2022-05-05 RX ADMIN — OXYCODONE HYDROCHLORIDE 10 MG: 5 TABLET ORAL at 10:05

## 2022-05-05 RX ADMIN — METOPROLOL TARTRATE 50 MG: 50 TABLET, FILM COATED ORAL at 09:05

## 2022-05-05 RX ADMIN — CYCLOBENZAPRINE 10 MG: 10 TABLET, FILM COATED ORAL at 09:05

## 2022-05-05 RX ADMIN — CLOPIDOGREL 75 MG: 75 TABLET, FILM COATED ORAL at 09:05

## 2022-05-05 RX ADMIN — ATORVASTATIN CALCIUM 40 MG: 40 TABLET, FILM COATED ORAL at 09:05

## 2022-05-05 RX ADMIN — OXYCODONE HYDROCHLORIDE 10 MG: 5 TABLET ORAL at 09:05

## 2022-05-05 RX ADMIN — ALLOPURINOL 300 MG: 300 TABLET ORAL at 09:05

## 2022-05-05 RX ADMIN — HYDROCHLOROTHIAZIDE 25 MG: 25 TABLET ORAL at 09:05

## 2022-05-05 RX ADMIN — SENNOSIDES, DOCUSATE SODIUM 1 TABLET: 8.6; 5 TABLET ORAL at 09:05

## 2022-05-05 RX ADMIN — FINASTERIDE 5 MG: 5 TABLET, FILM COATED ORAL at 09:05

## 2022-05-05 RX ADMIN — ASPIRIN 325 MG: 325 TABLET, COATED ORAL at 09:05

## 2022-05-05 RX ADMIN — CYCLOBENZAPRINE 10 MG: 10 TABLET, FILM COATED ORAL at 03:05

## 2022-05-05 RX ADMIN — LOSARTAN POTASSIUM 100 MG: 50 TABLET, FILM COATED ORAL at 09:05

## 2022-05-05 NOTE — ANESTHESIA POST-OP PAIN MANAGEMENT
Acute Pain Service Progress Note    Mahamed Jose is a 76 y.o., male, [id number].    Surgery:  Left  Anterior Hip Replacement     Post Op Day #: 1    Catheter type: none      Infusion type: none     Problem List:    Active Hospital Problems    Diagnosis  POA    *Primary osteoarthritis of left hip [M16.12]  Yes      Resolved Hospital Problems   No resolved problems to display.       Subjective:     General appearance of alert, oriented, no complaints   Pain with rest: 3    Numbers   Pain with movement: 3    Numbers   Side Effects    1. Pruritis No    2. Nausea No    3. Motor Blockade No, 0=Ability to raise lower extremities off bed    4. Sedation No, 1=awake and alert    Objective:     Catheter level single shot duramorph    Catheter site none   Catheter placement None      Vitals   Vitals:    05/05/22 0406   BP: (!) 101/55   Pulse: 73   Resp: 18   Temp: 37.3 °C (99.1 °F)        Labs    Admission on 05/04/2022   Component Date Value Ref Range Status    Sodium Level 05/05/2022 137  136 - 145 mmol/L Final    Potassium Level 05/05/2022 4.1  3.5 - 5.1 mmol/L Final    Chloride 05/05/2022 104  98 - 107 mmol/L Final    Carbon Dioxide 05/05/2022 25  23 - 31 mmol/L Final    Glucose Level 05/05/2022 112  82 - 115 mg/dL Final    Blood Urea Nitrogen 05/05/2022 18.0  8.4 - 25.7 mg/dL Final    Creatinine 05/05/2022 1.10  0.73 - 1.18 mg/dL Final    BUN/Creatinine Ratio 05/05/2022 16   Final    Calcium Level Total 05/05/2022 8.3 (A) 8.8 - 10.0 mg/dL Final    Estimated GFR- 05/05/2022 >60  mls/min/1.73/m2 Final    Anion Gap 05/05/2022 8.0  mEq/L Final    Hgb 05/05/2022 8.9 (A) 14.0 - 18.0 gm/dL Final    Hct 05/05/2022 27.1 (A) 42.0 - 52.0 % Final        Meds   Current Facility-Administered Medications   Medication Dose Route Frequency Provider Last Rate Last Admin    albuterol inhaler 2 puff  2 puff Inhalation Q4H PRN AJ Stinson        allopurinoL tablet 300 mg  300 mg Oral Daily  AJ Stinson   300 mg at 05/04/22 1601    aspirin EC tablet 325 mg  325 mg Oral Daily AJ Stinson        atorvastatin tablet 40 mg  40 mg Oral QHS AJ Stinson   40 mg at 05/04/22 2124    bisacodyL suppository 10 mg  10 mg Rectal Daily PRN AJ Stinson        clopidogreL tablet 75 mg  75 mg Oral Daily AJ Stinson        cyclobenzaprine tablet 10 mg  10 mg Oral TID AJ Stinson   10 mg at 05/04/22 2124    finasteride tablet 5 mg  5 mg Oral Daily AJ Stinson   5 mg at 05/04/22 1600    hydroCHLOROthiazide tablet 25 mg  25 mg Oral Daily AJ Stinson   25 mg at 05/04/22 1601    ketorolac injection 15 mg  15 mg Intravenous Q6H PRN AJ Stinson   15 mg at 05/04/22 2140    lactated ringers infusion   Intravenous Continuous AJ Stinson 75 mL/hr at 05/04/22 1601 New Bag at 05/04/22 1601    losartan tablet 100 mg  100 mg Oral Daily AJ Stinson   100 mg at 05/04/22 1600    melatonin tablet 6 mg  6 mg Oral Nightly PRN AJ Stinson        metoprolol tartrate (LOPRESSOR) tablet 50 mg  50 mg Oral BID AJ Stinson   50 mg at 05/04/22 2124    morphine injection 4 mg  4 mg Intravenous Q3H PRN AJ Stinson        nitroGLYCERIN SL tablet 0.4 mg  0.4 mg Sublingual Q5 Min PRN AJ Stinson        ondansetron injection 4 mg  4 mg Intravenous Q12H PRN AJ Stinson        oxyCODONE immediate release tablet 10 mg  10 mg Oral Q6H PRN AJ Stinson        promethazine tablet 25 mg  25 mg Oral Q6H PRN AJ Stinson        senna-docusate 8.6-50 mg per tablet 1 tablet  1 tablet Oral BID AJ Stinson   1 tablet at 05/04/22 2123    sodium chloride 0.9% flush 10 mL  10 mL Intravenous PRN Rei Sands,         sodium chloride 0.9% flush 10 mL  10 mL Intravenous PRN Rei Sands DO        sodium chloride 0.9% flush 10 mL  10 mL Intravenous PRN AJ Stinson        traMADoL tablet 50 mg  50 mg Oral Q6H  AJ Rasmussen            Anticoagulant dose none.    Assessment:     Pain control adequate    Plan:     Discontinue present therapy. Analgesia to be provided by the primary team, Dr. Lemos    Evaluator Alpa Steinberg CRNA

## 2022-05-05 NOTE — PT/OT/SLP PROGRESS
Physical Therapy Treatment    Patient Name:  Mahamed Jose   MRN:  [id number]    Recommendations:     Discharge Recommendations:  home with home health   Discharge Equipment Recommendations: walker, rolling   Barriers to discharge: None    Assessment:     Mahamed Jose is a 76 y.o. male admitted with a medical diagnosis of Primary osteoarthritis of left hip.  He presents with the following impairments/functional limitations:  gait instability, impaired functional mobilty, weakness, pain, orthopedic precautions.  Pt. With improved gait distance down hallway and understanding of hip precautions.    Rehab Prognosis: Good; patient would benefit from acute skilled PT services to address these deficits and reach maximum level of function.    Recent Surgery: Procedure(s) (LRB):  ARTHROPLASTY, HIP, TOTAL, ANTERIOR APPROACH (Left) 1 Day Post-Op    Plan:     During this hospitalization, patient to be seen BID to address the identified rehab impairments via gait training, therapeutic activities, therapeutic exercises and progress toward the following goals:    · Plan of Care Expires:  06/01/22    Subjective     Chief Complaint: Pt. Reports his hip is sore from sitting up in the chair a while.  Patient/Family Comments/goals: Walk without pain.  Pain/Comfort:  · Pain Rating 1: 3/10  · Location - Side 1: Left  · Location 1: hip  · Pain Addressed 1: Pre-medicate for activity, Reposition  · Pain Rating 2: 3/10      Objective:     Communicated with nurse prior to session.  Patient found up in chair with hemovac, peripheral IV upon PT entry to room.     General Precautions: Standard, fall   Orthopedic Precautions:LLE partial weight bearing   Braces:    Respiratory Status: Room air     Functional Mobility:  · Transfers:     · Sit to Stand:  minimum assistance with rolling walker  · Gait: 175ft with CGA/SBA with RW, PWB on LLE  · Balance: Standing balance with UE support on RW, CGA      AM-PAC 6 CLICK MOBILITY           Therapeutic Activities and Exercises:   See functional mobility above.    Patient left up in chair with call button in reach..    GOALS:   Multidisciplinary Problems     Physical Therapy Goals        Problem: Physical Therapy    Goal Priority Disciplines Outcome Goal Variances Interventions   Physical Therapy Goal     PT, PT/OT Ongoing, Progressing     Description: Goals to be met by:22    Patient will increase functional independence with mobility by performin. Supine to sit with Modified [place]  2. Sit to stand transfer with Modified [place]  3. Bed to chair transfer with Supervision using Rolling Walker  4.. Gait  x 75' feet with Supervision using Rolling Walker with PWB LLE.                      Time Tracking:     PT Received On: 22  PT Start Time: 1300     PT Stop Time: 1320  PT Total Time (min): 20 min     Billable Minutes: Therapeutic Activity 20    Treatment Type: Treatment  PT/PTA: PT           2022

## 2022-05-05 NOTE — PT/OT/SLP PROGRESS
Physical Therapy Treatment    Patient Name:  Mahamed Jose   MRN:  [id number]    Recommendations:     Discharge Recommendations:  home with home health   Discharge Equipment Recommendations: walker, rolling   Barriers to discharge: None    Assessment:     Mahamed Jose is a 76 y.o. male admitted with a medical diagnosis of Primary osteoarthritis of left hip.  He presents with the following impairments/functional limitations:  gait instability, orthopedic precautions, pain. Pt agreeable to PT, feeling good. He tolerated gait well, ambulating in hallway. Good understanding and compliance of WBS.     Rehab Prognosis: Good; patient would benefit from acute skilled PT services to address these deficits and reach maximum level of function.    Recent Surgery: Procedure(s) (LRB):  ARTHROPLASTY, HIP, TOTAL, ANTERIOR APPROACH (Left) 1 Day Post-Op    Plan:     During this hospitalization, patient to be seen BID to address the identified rehab impairments via gait training, therapeutic activities, therapeutic exercises and progress toward the following goals:    · Plan of Care Expires:  06/01/22    Subjective     Chief Complaint: L hip pain, wanting to get up OOB  Patient/Family Comments/goals: increase I with gait with decreased pain  Pain/Comfort:  · Pain Rating 1: 3/10  · Location - Side 1: Left  · Location 1: hip  · Pain Addressed 1: Pre-medicate for activity, Reposition  · Pain Rating Post-Intervention 1: 3/10      Objective:     Communicated with patient prior to session.  Patient found HOB elevated with hemovac, peripheral IV upon PT entry to room.     General Precautions: Standard, fall   Orthopedic Precautions:LLE partial weight bearing   Braces:    Respiratory Status: Room air     Functional Mobility:  · Bed Mobility:     · Supine to Sit: contact guard assistance  · Transfers:     · Sit to Stand:  contact guard assistance with rolling walker  · Gait: CGA with RW x 150' PWB LLE      AM-PAC 6 CLICK  MOBILITY          Therapeutic Activities and Exercises:   see mobility above. After gait, pt educated on exercises and ROM. Education provided to pt on safe use of AD to ensure maintenance of WBS.    Patient left up in chair with call button in reach..    GOALS:   Multidisciplinary Problems     Physical Therapy Goals        Problem: Physical Therapy    Goal Priority Disciplines Outcome Goal Variances Interventions   Physical Therapy Goal     PT, PT/OT Ongoing, Progressing     Description: Goals to be met by:22    Patient will increase functional independence with mobility by performin. Supine to sit with Modified [place]  2. Sit to stand transfer with Modified [place]  3. Bed to chair transfer with Supervision using Rolling Walker  4.. Gait  x 75' feet with Supervision using Rolling Walker with PWB LLE.                      Time Tracking:     PT Received On: 22  PT Start Time: 905     PT Stop Time: 925  PT Total Time (min): 20 min     Billable Minutes: Therapeutic Activity 20    Treatment Type: Treatment  PT/PTA: PTA           2022

## 2022-05-06 VITALS
HEART RATE: 88 BPM | DIASTOLIC BLOOD PRESSURE: 65 MMHG | TEMPERATURE: 98 F | OXYGEN SATURATION: 96 % | RESPIRATION RATE: 20 BRPM | SYSTOLIC BLOOD PRESSURE: 123 MMHG | WEIGHT: 188.94 LBS | HEIGHT: 67 IN | BODY MASS INDEX: 29.65 KG/M2

## 2022-05-06 PROBLEM — M16.9 OSTEOARTHRITIS OF HIP: Status: ACTIVE | Noted: 2022-05-04

## 2022-05-06 LAB
ANION GAP SERPL CALC-SCNC: 9 MEQ/L
BUN SERPL-MCNC: 11 MG/DL (ref 8.4–25.7)
CALCIUM SERPL-MCNC: 8.5 MG/DL (ref 8.8–10)
CHLORIDE SERPL-SCNC: 103 MMOL/L (ref 98–107)
CO2 SERPL-SCNC: 25 MMOL/L (ref 23–31)
CREAT SERPL-MCNC: 0.76 MG/DL (ref 0.73–1.18)
CREAT/UREA NIT SERPL: 14
GLUCOSE SERPL-MCNC: 129 MG/DL (ref 82–115)
HCT VFR BLD AUTO: 27.4 % (ref 42–52)
HGB BLD-MCNC: 9.3 GM/DL (ref 14–18)
POTASSIUM SERPL-SCNC: 3.7 MMOL/L (ref 3.5–5.1)
SODIUM SERPL-SCNC: 137 MMOL/L (ref 136–145)

## 2022-05-06 PROCEDURE — 97530 THERAPEUTIC ACTIVITIES: CPT

## 2022-05-06 PROCEDURE — 27000221 HC OXYGEN, UP TO 24 HOURS

## 2022-05-06 PROCEDURE — 25000003 PHARM REV CODE 250: Performed by: PHYSICIAN ASSISTANT

## 2022-05-06 PROCEDURE — 80048 BASIC METABOLIC PNL TOTAL CA: CPT | Performed by: PHYSICIAN ASSISTANT

## 2022-05-06 PROCEDURE — 36415 COLL VENOUS BLD VENIPUNCTURE: CPT | Performed by: PHYSICIAN ASSISTANT

## 2022-05-06 PROCEDURE — 94799 UNLISTED PULMONARY SVC/PX: CPT

## 2022-05-06 PROCEDURE — 99900031 HC PATIENT EDUCATION (STAT)

## 2022-05-06 PROCEDURE — 94761 N-INVAS EAR/PLS OXIMETRY MLT: CPT

## 2022-05-06 PROCEDURE — 85014 HEMATOCRIT: CPT | Performed by: PHYSICIAN ASSISTANT

## 2022-05-06 RX ADMIN — ASPIRIN 325 MG: 325 TABLET, COATED ORAL at 09:05

## 2022-05-06 RX ADMIN — METOPROLOL TARTRATE 50 MG: 50 TABLET, FILM COATED ORAL at 09:05

## 2022-05-06 RX ADMIN — LOSARTAN POTASSIUM 100 MG: 50 TABLET, FILM COATED ORAL at 09:05

## 2022-05-06 RX ADMIN — HYDROCHLOROTHIAZIDE 25 MG: 25 TABLET ORAL at 09:05

## 2022-05-06 RX ADMIN — CYCLOBENZAPRINE 10 MG: 10 TABLET, FILM COATED ORAL at 02:05

## 2022-05-06 RX ADMIN — OXYCODONE HYDROCHLORIDE 10 MG: 5 TABLET ORAL at 11:05

## 2022-05-06 RX ADMIN — CYCLOBENZAPRINE 10 MG: 10 TABLET, FILM COATED ORAL at 09:05

## 2022-05-06 RX ADMIN — ALLOPURINOL 300 MG: 300 TABLET ORAL at 09:05

## 2022-05-06 RX ADMIN — CLOPIDOGREL 75 MG: 75 TABLET, FILM COATED ORAL at 09:05

## 2022-05-06 RX ADMIN — SENNOSIDES, DOCUSATE SODIUM 1 TABLET: 8.6; 5 TABLET ORAL at 09:05

## 2022-05-06 RX ADMIN — FINASTERIDE 5 MG: 5 TABLET, FILM COATED ORAL at 09:05

## 2022-05-06 NOTE — PT/OT/SLP PROGRESS
Physical Therapy Treatment    Patient Name:  Mahamed Jose   MRN:  [id number]    Recommendations:     Discharge Recommendations:  home with home health   Discharge Equipment Recommendations: walker, rolling   Barriers to discharge: None    Assessment:     Mahamed Jose is a 76 y.o. male admitted with a medical diagnosis of Primary osteoarthritis of left hip.  He presents with the following impairments/functional limitations:  weakness, gait instability, impaired functional mobilty, pain, orthopedic precautions.    Patient requires cues and reminding of hip precautions at times during bed mobility and transfers.  Compliant with PWB during ambulation.  Progressing well with standing balance.  Today was able to ambulate to bathroom and perform hygiene at sink for 5 minutes.    Rehab Prognosis: Good; patient would benefit from acute skilled PT services to address these deficits and reach maximum level of function.    Recent Surgery: Procedure(s) (LRB):  ARTHROPLASTY, HIP, TOTAL, ANTERIOR APPROACH (Left) 2 Days Post-Op    Plan:     During this hospitalization, patient to be seen BID to address the identified rehab impairments via gait training, therapeutic activities, therapeutic exercises and progress toward the following goals:    · Plan of Care Expires:  06/01/22    Subjective     Chief Complaint: Hip soreness  Patient/Family Comments/goals: Walk without pain  Pain/Comfort:  · Pain Rating 1: 6/10  · Location - Side 1: Left  · Location 1: hip  · Pain Addressed 1: Pre-medicate for activity, Reposition  · Pain Rating Post-Intervention 1: 4/10      Objective:     Communicated with nurse prior to session.  Patient found supine with hemovac, peripheral IV upon PT entry to room.     General Precautions: Standard, fall   Orthopedic Precautions:LLE partial weight bearing   Braces:    Respiratory Status: Room air     Functional Mobility:  · Bed Mobility:     · Supine to Sit: minimum assistance  · Transfers:      · Sit to Stand:  minimum assistance with rolling walker  · Bed to Chair: minimum assistance with  rolling walker  using  Stand Pivot  · Gait: 150 ft with CGA/SBA with RW PWB on L  · Balance: Standing without UE support CGA/SBA      AM-PAC 6 CLICK MOBILITY          Therapeutic Activities and Exercises:   Patient ambulated down hallway and into bathroom to perform hygiene at sink.  Quad sets and LAQs x 10 reps.  Functional mobility as above.    Patient left up in chair with call button in reach..    GOALS:   Multidisciplinary Problems     Physical Therapy Goals        Problem: Physical Therapy    Goal Priority Disciplines Outcome Goal Variances Interventions   Physical Therapy Goal     PT, PT/OT Ongoing, Progressing     Description: Goals to be met by:22    Patient will increase functional independence with mobility by performin. Supine to sit with Modified [place]  2. Sit to stand transfer with Modified [place]  3. Bed to chair transfer with Supervision using Rolling Walker  4.. Gait  x 75' feet with Supervision using Rolling Walker with PWB LLE.                      Time Tracking:     PT Received On: 22  PT Start Time: 0830     PT Stop Time: 0850  PT Total Time (min): 20 min     Billable Minutes: Therapeutic Activity 20    Treatment Type: Treatment  PT/PTA: PT           2022

## 2022-05-06 NOTE — PT/OT/SLP PROGRESS
Physical Therapy Treatment    Patient Name:  Mahamed Jose   MRN:  [id number]    Recommendations:     Discharge Recommendations:  home with home health   Discharge Equipment Recommendations: walker, rolling   Barriers to discharge: None    Assessment:     Mahamed Jose is a 76 y.o. male admitted with a medical diagnosis of Primary osteoarthritis of left hip.  He presents with the following impairments/functional limitations:  gait instability, impaired balance, orthopedic precautions.    Pt tolerated gait in hallway. He demonstrates good compliance with WBS and use of AD.     Rehab Prognosis: Good; patient would benefit from acute skilled PT services to address these deficits and reach maximum level of function.    Recent Surgery: Procedure(s) (LRB):  ARTHROPLASTY, HIP, TOTAL, ANTERIOR APPROACH (Left) 2 Days Post-Op    Plan:     During this hospitalization, patient to be seen BID to address the identified rehab impairments via therapeutic activities, therapeutic exercises, gait training and progress toward the following goals:    · Plan of Care Expires:  06/01/22    Subjective     Chief Complaint: pain  Patient/Family Comments/goals: increase I with gait  Pain/Comfort:  · Pain Rating 1: 4/10  · Location - Side 1: Left  · Location 1: hip  · Pain Rating Post-Intervention 1: 3/10      Objective:     Communicated with patient prior to session.  Patient found up in chair with hemovac upon PT entry to room.     General Precautions: Standard, fall   Orthopedic Precautions:LLE partial weight bearing   Braces:    Respiratory Status: Room air     Functional Mobility:  · Transfers:     · Sit to Stand:  stand by assistance with rolling walker  · Gait: SBA x 150 with RW PWB LLE      AM-PAC 6 CLICK MOBILITY          Therapeutic Activities and Exercises:   see mobility above. Reviewed ROM and exercises with pt.    Patient left up in chair with all lines intact and call button in reach..    GOALS:    Multidisciplinary Problems     Physical Therapy Goals        Problem: Physical Therapy    Goal Priority Disciplines Outcome Goal Variances Interventions   Physical Therapy Goal     PT, PT/OT Ongoing, Progressing     Description: Goals to be met by:22    Patient will increase functional independence with mobility by performin. Supine to sit with Modified [place]  2. Sit to stand transfer with Modified [place]  3. Bed to chair transfer with Supervision using Rolling Walker  4.. Gait  x 75' feet with Supervision using Rolling Walker with PWB LLE.                      Time Tracking:     PT Received On: 22  PT Start Time: 1310     PT Stop Time: 1325  PT Total Time (min): 15 min     Billable Minutes: Therapeutic Activity 15    Treatment Type: Treatment  PT/PTA: PTA           2022

## 2022-05-06 NOTE — DISCHARGE SUMMARY
[facility]  Medical Surgical Unit  Orthopedics  Discharge Summary      Patient Name: Mahamed Jose  MRN: [id number]  Admission Date: 5/4/2022  Hospital Length of Stay: 2 days  Discharge Date and Time:  05/06/2022 4:53 PM  Attending Physician: Zach Lemos MD   Discharging Provider: Zach Lemos MD  Primary Care Provider: Boubacar Mathews MD    HPI:   No notes on file    Procedure(s) (LRB):  ARTHROPLASTY, HIP, TOTAL, ANTERIOR APPROACH (Left)      Hospital Course:  Discharge summary on Mr. Jose.  He came to the hospital for a total hip replacement.      Goals of Care Treatment Preferences:             Significant Diagnostic Studies: Labs:   BMP:   Recent Labs   Lab 05/05/22  0415 05/06/22  0339   CO2 25 25   BUN 18.0 11.0   CREATININE 1.10 0.76   CALCIUM 8.3* 8.5*       Pending Diagnostic Studies:     None        Final Active Diagnoses:      Problems Resolved During this Admission:    Diagnosis Date Noted Date Resolved POA    PRINCIPAL PROBLEM:  Primary osteoarthritis of left hip [M16.12] 05/04/2022 05/05/2022 Yes      Discharged Condition: stable    Disposition: Home-Health Care [facility]    Follow Up:   Follow-up Information     Zach Lemos MD. Schedule an appointment as soon as possible for a visit on 5/16/2022.    Specialty: Orthopedic Surgery  Contact information:  [address] [place] [address] [id number] [phone]                       Patient Instructions:      Notify your health care provider if you experience any of the following:  temperature >100.4     Notify your health care provider if you experience any of the following:  difficulty breathing or increased cough     Leave dressing on - Keep it clean, dry, and intact until clinic visit     Activity as tolerated     Weight bearing restrictions (specify):     Medications:  Reconciled Home Medications:      Medication List      START taking these medications    cefadroxil 500 MG Cap  Commonly known as:  DURICEF  Take 2 capsules (1 g total) by mouth every 12 (twelve) hours. for 7 days     HYDROcodone-acetaminophen  mg per tablet  Commonly known as: NORCO  Take 1 tablet by mouth every 6 (six) hours as needed for Pain.     ketorolac 10 mg tablet  Commonly known as: TORADOL  Take 1 tablet (10 mg total) by mouth every 6 (six) hours. for 5 days        CHANGE how you take these medications    aspirin 325 MG EC tablet  Commonly known as: ECOTRIN  Take 1 tablet (325 mg total) by mouth 2 (two) times a day.  What changed: when to take this        CONTINUE taking these medications    albuterol 90 mcg/actuation inhaler  Commonly known as: PROVENTIL/VENTOLIN HFA  Inhale 2 puffs into the lungs every 4 (four) hours as needed for Wheezing. Rescue     allopurinoL 300 MG tablet  Commonly known as: ZYLOPRIM  Take 300 mg by mouth once daily.     atorvastatin 40 MG tablet  Commonly known as: LIPITOR  Take 40 mg by mouth every evening.     clopidogreL 75 mg tablet  Commonly known as: PLAVIX  Take 75 mg by mouth once daily.     cyclobenzaprine 10 MG tablet  Commonly known as: FLEXERIL  Take 10 mg by mouth after meals and at bedtime as needed for Muscle spasms.     finasteride 5 mg tablet  Commonly known as: PROSCAR  Take 5 mg by mouth once daily.     hydroCHLOROthiazide 25 MG tablet  Commonly known as: HYDRODIURIL  Take 25 mg by mouth once daily.     irbesartan 300 MG tablet  Commonly known as: AVAPRO  Take 300 mg by mouth once daily.     metoprolol tartrate 50 MG tablet  Commonly known as: LOPRESSOR  Take 50 mg by mouth 2 (two) times daily.     nitroGLYCERIN 0.4 MG SL tablet  Commonly known as: NITROSTAT  Place 0.4 mg under the tongue every 5 (five) minutes as needed for Chest pain.            Zach Lemos MD  Orthopedics  [facility] - Medical Surgical Unit

## 2022-05-10 ENCOUNTER — PATIENT OUTREACH (OUTPATIENT)
Dept: ADMINISTRATIVE | Facility: CLINIC | Age: 77
End: 2022-05-10
Payer: MEDICARE

## 2022-05-10 NOTE — PROGRESS NOTES
C3 nurse attempted to contact Mahamed Jose for a TCC post hospital discharge follow up call. No answer. Left Voicemail. The patient does not have a scheduled HOSFU appointment that I can see.

## 2022-05-12 LAB
ESTROGEN SERPL-MCNC: NORMAL PG/ML
INSULIN SERPL-ACNC: NORMAL U[IU]/ML
LAB AP CLINICAL INFORMATION: NORMAL
LAB AP GROSS DESCRIPTION: NORMAL
LAB AP REPORT FOOTNOTES: NORMAL
T3RU NFR SERPL: NORMAL %

## 2022-05-16 ENCOUNTER — HOSPITAL ENCOUNTER (OUTPATIENT)
Dept: RADIOLOGY | Facility: HOSPITAL | Age: 77
Discharge: HOME | End: 2022-05-16
Attending: SPECIALIST
Payer: MEDICARE

## 2022-05-16 DIAGNOSIS — M25.552 LEFT HIP PAIN: ICD-10-CM

## 2022-05-16 PROCEDURE — 73502 X-RAY EXAM HIP UNI 2-3 VIEWS: CPT | Mod: TC,LT

## 2022-05-23 ENCOUNTER — HOSPITAL ENCOUNTER (OUTPATIENT)
Dept: RADIOLOGY | Facility: HOSPITAL | Age: 77
Discharge: HOME | End: 2022-05-23
Attending: SPECIALIST
Payer: MEDICARE

## 2022-05-23 DIAGNOSIS — M25.552 LEFT HIP PAIN: ICD-10-CM

## 2022-05-23 PROCEDURE — 73502 X-RAY EXAM HIP UNI 2-3 VIEWS: CPT | Mod: TC,LT

## 2023-05-31 ENCOUNTER — HOSPITAL ENCOUNTER (OUTPATIENT)
Dept: RADIOLOGY | Facility: HOSPITAL | Age: 78
Discharge: HOME | End: 2023-05-31
Attending: FAMILY MEDICINE
Payer: MEDICARE

## 2023-05-31 DIAGNOSIS — M54.2 NECK PAIN: ICD-10-CM

## 2023-05-31 PROCEDURE — 72040 X-RAY EXAM NECK SPINE 2-3 VW: CPT | Mod: TC

## 2023-07-10 DIAGNOSIS — M54.2 NECK PAIN: Primary | ICD-10-CM

## 2023-07-17 ENCOUNTER — HOSPITAL ENCOUNTER (OUTPATIENT)
Dept: RADIOLOGY | Facility: HOSPITAL | Age: 78
Discharge: HOME | End: 2023-07-17
Attending: FAMILY MEDICINE
Payer: MEDICARE

## 2023-07-17 DIAGNOSIS — M54.2 NECK PAIN: ICD-10-CM

## 2023-07-17 PROCEDURE — 72141 MRI NECK SPINE W/O DYE: CPT | Mod: TC

## 2024-04-16 ENCOUNTER — CLINICAL SUPPORT (OUTPATIENT)
Dept: RESPIRATORY THERAPY | Facility: HOSPITAL | Age: 79
End: 2024-04-16
Attending: FAMILY MEDICINE
Payer: MEDICARE

## 2024-04-16 ENCOUNTER — HOSPITAL ENCOUNTER (OUTPATIENT)
Dept: RADIOLOGY | Facility: HOSPITAL | Age: 79
Discharge: HOME | End: 2024-04-16
Attending: FAMILY MEDICINE
Payer: MEDICARE

## 2024-04-16 DIAGNOSIS — R07.9 CHEST PAIN: ICD-10-CM

## 2024-04-16 DIAGNOSIS — R07.9 CHEST PAIN: Primary | ICD-10-CM

## 2024-04-16 LAB
OHS QRS DURATION: 146 MS
OHS QTC CALCULATION: 442 MS

## 2024-04-16 PROCEDURE — 93005 ELECTROCARDIOGRAM TRACING: CPT

## 2024-04-16 PROCEDURE — 71046 X-RAY EXAM CHEST 2 VIEWS: CPT | Mod: TC

## 2024-05-23 ENCOUNTER — HOSPITAL ENCOUNTER (OUTPATIENT)
Facility: HOSPITAL | Age: 79
Discharge: HOME | End: 2024-05-23
Attending: INTERNAL MEDICINE | Admitting: INTERNAL MEDICINE
Payer: MEDICARE

## 2024-05-23 VITALS
HEART RATE: 64 BPM | OXYGEN SATURATION: 96 % | TEMPERATURE: 97 F | DIASTOLIC BLOOD PRESSURE: 61 MMHG | WEIGHT: 198.44 LBS | HEIGHT: 69 IN | BODY MASS INDEX: 29.39 KG/M2 | RESPIRATION RATE: 19 BRPM | SYSTOLIC BLOOD PRESSURE: 132 MMHG

## 2024-05-23 DIAGNOSIS — I25.10 CAD (CORONARY ARTERY DISEASE): ICD-10-CM

## 2024-05-23 DIAGNOSIS — R94.39 ABNORMAL STRESS TEST: ICD-10-CM

## 2024-05-23 LAB
OHS QRS DURATION: 140 MS
OHS QRS DURATION: 148 MS
OHS QTC CALCULATION: 413 MS
OHS QTC CALCULATION: 467 MS

## 2024-05-23 PROCEDURE — 92979 ENDOLUMINL IVUS OCT C EA: CPT | Mod: LC | Performed by: INTERNAL MEDICINE

## 2024-05-23 PROCEDURE — C1887 CATHETER, GUIDING: HCPCS | Performed by: INTERNAL MEDICINE

## 2024-05-23 PROCEDURE — 93458 L HRT ARTERY/VENTRICLE ANGIO: CPT | Mod: XU | Performed by: INTERNAL MEDICINE

## 2024-05-23 PROCEDURE — C1769 GUIDE WIRE: HCPCS | Performed by: INTERNAL MEDICINE

## 2024-05-23 PROCEDURE — C1725 CATH, TRANSLUMIN NON-LASER: HCPCS | Performed by: INTERNAL MEDICINE

## 2024-05-23 PROCEDURE — C1894 INTRO/SHEATH, NON-LASER: HCPCS | Performed by: INTERNAL MEDICINE

## 2024-05-23 PROCEDURE — 93005 ELECTROCARDIOGRAM TRACING: CPT

## 2024-05-23 PROCEDURE — 93010 ELECTROCARDIOGRAM REPORT: CPT | Mod: ,,, | Performed by: INTERNAL MEDICINE

## 2024-05-23 PROCEDURE — C9600 PERC DRUG-EL COR STENT SING: HCPCS | Mod: RC,LC | Performed by: INTERNAL MEDICINE

## 2024-05-23 PROCEDURE — 99153 MOD SED SAME PHYS/QHP EA: CPT | Performed by: INTERNAL MEDICINE

## 2024-05-23 PROCEDURE — 63600175 PHARM REV CODE 636 W HCPCS: Mod: JZ,JG | Performed by: INTERNAL MEDICINE

## 2024-05-23 PROCEDURE — 92978 ENDOLUMINL IVUS OCT C 1ST: CPT | Mod: RC | Performed by: INTERNAL MEDICINE

## 2024-05-23 PROCEDURE — C1874 STENT, COATED/COV W/DEL SYS: HCPCS | Performed by: INTERNAL MEDICINE

## 2024-05-23 PROCEDURE — 25000003 PHARM REV CODE 250: Performed by: INTERNAL MEDICINE

## 2024-05-23 PROCEDURE — C1753 CATH, INTRAVAS ULTRASOUND: HCPCS | Performed by: INTERNAL MEDICINE

## 2024-05-23 PROCEDURE — 93005 ELECTROCARDIOGRAM TRACING: CPT | Mod: 59

## 2024-05-23 PROCEDURE — 27201423 OPTIME MED/SURG SUP & DEVICES STERILE SUPPLY: Performed by: INTERNAL MEDICINE

## 2024-05-23 PROCEDURE — 99152 MOD SED SAME PHYS/QHP 5/>YRS: CPT | Performed by: INTERNAL MEDICINE

## 2024-05-23 DEVICE — EVEROLIMUS-ELUTING PLATINUM CHROMIUM CORONARY STENT SYSTEM
Type: IMPLANTABLE DEVICE | Site: ARTERIAL | Status: FUNCTIONAL
Brand: SYNERGY™ XD

## 2024-05-23 RX ORDER — DIAZEPAM 5 MG/1
10 TABLET ORAL
Status: DISCONTINUED | OUTPATIENT
Start: 2024-05-23 | End: 2024-05-23 | Stop reason: HOSPADM

## 2024-05-23 RX ORDER — FENTANYL CITRATE 50 UG/ML
INJECTION, SOLUTION INTRAMUSCULAR; INTRAVENOUS
Status: DISCONTINUED | OUTPATIENT
Start: 2024-05-23 | End: 2024-05-23 | Stop reason: HOSPADM

## 2024-05-23 RX ORDER — ONDANSETRON 4 MG/1
8 TABLET, ORALLY DISINTEGRATING ORAL EVERY 8 HOURS PRN
Status: DISCONTINUED | OUTPATIENT
Start: 2024-05-23 | End: 2024-05-23 | Stop reason: HOSPADM

## 2024-05-23 RX ORDER — DIPHENHYDRAMINE HCL 25 MG
50 CAPSULE ORAL
Status: DISCONTINUED | OUTPATIENT
Start: 2024-05-23 | End: 2024-05-23 | Stop reason: HOSPADM

## 2024-05-23 RX ORDER — LIDOCAINE HYDROCHLORIDE 10 MG/ML
INJECTION INFILTRATION; PERINEURAL
Status: DISCONTINUED | OUTPATIENT
Start: 2024-05-23 | End: 2024-05-23 | Stop reason: HOSPADM

## 2024-05-23 RX ORDER — HEPARIN SODIUM 1000 [USP'U]/ML
INJECTION, SOLUTION INTRAVENOUS; SUBCUTANEOUS
Status: DISCONTINUED | OUTPATIENT
Start: 2024-05-23 | End: 2024-05-23 | Stop reason: HOSPADM

## 2024-05-23 RX ORDER — SODIUM CHLORIDE 9 MG/ML
INJECTION, SOLUTION INTRAVENOUS ONCE
Status: COMPLETED | OUTPATIENT
Start: 2024-05-23 | End: 2024-05-23

## 2024-05-23 RX ORDER — MIDAZOLAM HYDROCHLORIDE 1 MG/ML
INJECTION INTRAMUSCULAR; INTRAVENOUS
Status: DISCONTINUED | OUTPATIENT
Start: 2024-05-23 | End: 2024-05-23 | Stop reason: HOSPADM

## 2024-05-23 RX ORDER — ASPIRIN 81 MG/1
81 TABLET ORAL DAILY
COMMUNITY

## 2024-05-23 RX ORDER — NITROGLYCERIN 20 MG/100ML
INJECTION INTRAVENOUS
Status: DISCONTINUED | OUTPATIENT
Start: 2024-05-23 | End: 2024-05-23 | Stop reason: HOSPADM

## 2024-05-23 RX ORDER — SODIUM CHLORIDE 9 MG/ML
INJECTION, SOLUTION INTRAVENOUS CONTINUOUS
Status: DISCONTINUED | OUTPATIENT
Start: 2024-05-23 | End: 2024-05-23 | Stop reason: HOSPADM

## 2024-05-23 RX ORDER — ACETAMINOPHEN 325 MG/1
650 TABLET ORAL EVERY 4 HOURS PRN
Status: DISCONTINUED | OUTPATIENT
Start: 2024-05-23 | End: 2024-05-23 | Stop reason: HOSPADM

## 2024-05-23 RX ORDER — VERAPAMIL HYDROCHLORIDE 2.5 MG/ML
INJECTION, SOLUTION INTRAVENOUS
Status: DISCONTINUED | OUTPATIENT
Start: 2024-05-23 | End: 2024-05-23 | Stop reason: HOSPADM

## 2024-05-23 RX ADMIN — DIPHENHYDRAMINE HYDROCHLORIDE 50 MG: 25 CAPSULE ORAL at 09:05

## 2024-05-23 RX ADMIN — SODIUM CHLORIDE: 9 INJECTION, SOLUTION INTRAVENOUS at 09:05

## 2024-05-23 RX ADMIN — DIAZEPAM 10 MG: 5 TABLET ORAL at 09:05

## 2024-05-23 NOTE — Clinical Note
The catheter was inserted into the and was inserted over the wire into the distal   right coronary artery. An angiography was performed of the right coronary arteries. Multiple views were taken. The angiography was performed via power injection.

## 2024-05-23 NOTE — Clinical Note
The catheter was inserted into the, was removed from the, insertion attempt was made into the and was inserted over the wire into the ostium   left main. The catheter was unable to engage the area..

## 2024-05-23 NOTE — Clinical Note
An angiography was performed of the right coronary arteries. The angiography was performed via power injection.  RCA post-stent placement

## 2024-05-23 NOTE — Clinical Note
The catheter was inserted into the, was removed from the and was inserted over the wire into the left ventricle. Hemodynamics were performed.  and Pullback was recorded.  An angiography was performed of the LV. The angiography was performed via power injection.   55%.

## 2024-05-23 NOTE — PLAN OF CARE
Bed rest completed. Site WDL, armboard in place; hemostasis maintained; dressing clean, dry, and intact. VSS. Discharge instructions given, patient verbalizes understanding; IV removed and patient transported via wheelchair to granddaughter's vehicle. Patient in no acute distress upon discharge.

## 2024-05-23 NOTE — INTERVAL H&P NOTE
Patient name: Mahamed Jose  MRN: [id number]  : 1945  Cath Lab Procedure H&P Update    Pre-Procedure Assessment:    I saw and examined the patient face to face. The patient has been re-evaluated and his condition is unchanged. The reason for admission, procedure and care is still present.  Based on the patients H&P, pre-procedure physical exam, relevant diagnostic studies, NPO status and information obtained from the patient, I determine the patient is an appropriate candidate for the proposed procedure and anesthesia planned. I further certify the anesthesia risks, benefits and options have been explained to the patient to which he agrees as documented on the procedural consent.

## 2024-05-23 NOTE — Clinical Note
The catheter was inserted into the, was removed from the and was inserted over the wire into the mid   circumflex. IVUS performed

## 2024-08-18 ENCOUNTER — HOSPITAL ENCOUNTER (EMERGENCY)
Facility: HOSPITAL | Age: 79
Discharge: HOME | End: 2024-08-18
Attending: EMERGENCY MEDICINE
Payer: MEDICARE

## 2024-08-18 VITALS
BODY MASS INDEX: 28.64 KG/M2 | RESPIRATION RATE: 19 BRPM | SYSTOLIC BLOOD PRESSURE: 169 MMHG | DIASTOLIC BLOOD PRESSURE: 90 MMHG | WEIGHT: 189 LBS | OXYGEN SATURATION: 95 % | HEIGHT: 68 IN | TEMPERATURE: 98 F | HEART RATE: 89 BPM

## 2024-08-18 DIAGNOSIS — M10.9 ACUTE GOUT OF LEFT HAND, UNSPECIFIED CAUSE: Primary | ICD-10-CM

## 2024-08-18 PROCEDURE — 96372 THER/PROPH/DIAG INJ SC/IM: CPT | Performed by: EMERGENCY MEDICINE

## 2024-08-18 PROCEDURE — 63600175 PHARM REV CODE 636 W HCPCS: Performed by: EMERGENCY MEDICINE

## 2024-08-18 PROCEDURE — 99284 EMERGENCY DEPT VISIT MOD MDM: CPT | Mod: 25

## 2024-08-18 RX ORDER — METHYLPREDNISOLONE 4 MG/1
TABLET ORAL
Qty: 21 EACH | Refills: 0 | Status: SHIPPED | OUTPATIENT
Start: 2024-08-18 | End: 2024-09-08

## 2024-08-18 RX ORDER — KETOROLAC TROMETHAMINE 30 MG/ML
30 INJECTION, SOLUTION INTRAMUSCULAR; INTRAVENOUS
Status: COMPLETED | OUTPATIENT
Start: 2024-08-18 | End: 2024-08-18

## 2024-08-18 RX ADMIN — KETOROLAC TROMETHAMINE 30 MG: 30 INJECTION, SOLUTION INTRAMUSCULAR at 11:08

## 2024-08-18 NOTE — ED PROVIDER NOTES
ED PROVIDER NOTE  8/18/2024    CHIEF COMPLAINT:   Chief Complaint   Patient presents with    Hand Pain     Finger pain to index finger on Left hand. States PCP told him it was gout but unsure of what he can take for the pain. States no injury to the area       HISTORY OF PRESENT ILLNESS:   Mahamed Jose is a 78 y.o. male who presents with chief complaint Finger pain onset was last week whenever he began having pain does index and small finger that he states is constant.  He went and saw his PCP who said it was a gout flare in prescribed him Norco but states it is not getting any better. He does have a history of gout and states he was on allopurinol at 1 point in time but is no longer taking this medication.  Denies fever, trauma or injury.    The history is provided by the patient.         REVIEW OF SYSTEMS: as noted in the HPI.  NURSING NOTES REVIEWED      PAST MEDICAL/SURGICAL HISTORY:   Past Medical History:   Diagnosis Date    Anemia, unspecified     Carotid artery stenosis     Gastro-esophageal reflux disease without esophagitis     Peripheral artery disease     Stroke       Past Surgical History:   Procedure Laterality Date    ARTHROPLASTY OF HIP BY ANTERIOR APPROACH Left 5/4/2022    Procedure: ARTHROPLASTY, HIP, TOTAL, ANTERIOR APPROACH;  Surgeon: Zach Lemos MD;  Location: [facility];  Service: Orthopedics;  Laterality: Left;    BACK SURGERY      CAROTID ENDARTERECTOMY Left 12/03/2015    COLONOSCOPY N/A 03/26/2018    CORONARY STENT PLACEMENT N/A     x 2    ESOPHAGOGASTRODUODENOSCOPY  03/26/2018    INTRAVASCULAR ULTRASOUND, CORONARY N/A 5/23/2024    Procedure: Ultrasound-coronary;  Surgeon: Shantal Mulligan MD;  Location: [facility] CATH LAB;  Service: Cardiology;  Laterality: N/A;    LEFT HEART CATHETERIZATION Left 5/23/2024    Procedure: Left heart cath;  Surgeon: Shantal Mulligan MD;  Location: [facility] CATH LAB;  Service: Cardiology;  Laterality: Left;  LHC +/- PCI    PENILE PROSTHESIS  IMPLANT N/A     ROTATOR CUFF REPAIR Right     STENT, DRUG ELUTING, SINGLE VESSEL, CORONARY  5/23/2024    Procedure: Stent, Drug Eluting, Single Vessel, Coronary;  Surgeon: Shantal Mulligan MD;  Location: [facility] CATH LAB;  Service: Cardiology;;    TOTAL KNEE ARTHROPLASTY Right 10/18/2017       FAMILY HISTORY:   Family History   Problem Relation Name Age of Onset    Heart disease Mother      Aneurysm Mother      Heart attack Father         SOCIAL HISTORY:   Social History     Tobacco Use    Smoking status: Former     Types: Cigarettes    Smokeless tobacco: Former   Substance Use Topics    Alcohol use: Never    Drug use: Never       ALLERGIES: Review of patient's allergies indicates:  No Known Allergies    PHYSICAL EXAM:  Initial Vitals [08/18/24 1056]   BP Pulse Resp Temp SpO2   (!) 169/90 89 19 98.2 °F (36.8 °C) 95 %      MAP       --         Physical Exam    Nursing note and vitals reviewed.  Constitutional: He appears well-developed and well-nourished. No distress.   HENT:   Head: Normocephalic and atraumatic.   Nose: Nose normal.   Mouth/Throat: Oropharynx is clear and moist and mucous membranes are normal.   Eyes: Conjunctivae and EOM are normal. Pupils are equal, round, and reactive to light.   Neck: Neck supple. No tracheal deviation present.   Cardiovascular:  Normal rate, regular rhythm, normal heart sounds, intact distal pulses and normal pulses.           Pulmonary/Chest: Effort normal and breath sounds normal. No respiratory distress.   Abdominal: Abdomen is soft. There is no abdominal tenderness. There is no rebound and no guarding.   Musculoskeletal:      Left hand: Swelling and tenderness present. Decreased range of motion.      Cervical back: Neck supple.      Comments: Pain and swelling to PIP of left index and small finger     Neurological: He is alert and oriented to person, place, and time. GCS eye subscore is 4. GCS verbal subscore is 5. GCS motor subscore is 6.   CN II-XII intact. Moves all  extremities. No gross sensory or motor deficits.   Skin: Skin is warm, dry and intact.   Psychiatric: He has a normal mood and affect. His speech is normal and behavior is normal. Judgment and thought content normal. Cognition and memory are normal.         RESULTS:  Labs Reviewed - No data to display  Imaging Results    None         PROCEDURES:  Procedures    ECG:       ED COURSE AND MEDICAL DECISION MAKING:  Medications   ketorolac injection 30 mg (30 mg Intramuscular Given 8/18/24 1108)           Medical Decision Making  78-year-old male who presents with the pain and swelling to left index and small finger that started a week ago.  Differential diagnosis includes gout, osteoarthritis, septic arthritis.  Patient reports history of gout and used to be on allopurinol but it was no longer on this medication.  Low suspicion for septic arthritis especially given the polyarticular nature of the hand.  We will start him on Medrol Dosepak and instructed him to follow up with his PCP.  Given strict ED return precautions. I have spoken with the patient and/or caregivers. I have explained the patient's condition, diagnoses and treatment plan based on the information available to me at this time. I have answered the patient's and/or caregiver's questions and addressed any concerns. The patient and/or caregivers have as good an understanding of the patient's diagnosis, condition and treatment plan as can be expected at this point. The vital signs have been stable. The patient's condition is stable and appropriate for discharge from the emergency department.     The patient will pursue further outpatient evaluation with the primary care physician or other designated or consulting physician as outlined in the discharge instructions. The patient and/or caregivers are agreeable to this plan of care and follow-up instructions have been explained in detail. The patient and/or caregivers have received these instructions in written  format and have expressed an understanding of the discharge instructions. The patient and/or caregivers are aware that any significant change in condition or worsening of symptoms should prompt an immediate return to this or the closest emergency department or a call to 911.    Risk  OTC drugs.  Prescription drug management.        CLINICAL IMPRESSION:  1. Acute gout of left hand, unspecified cause        DISPOSITION:   ED Disposition Condition    Discharge Stable            ED Prescriptions       Medication Sig Dispense Start Date End Date Auth. Provider    methylPREDNISolone (MEDROL DOSEPACK) 4 mg tablet use as directed 21 each 8/18/2024 9/8/2024 Jim Isidro DO          Follow-up Information       Follow up With Specialties Details Why Contact Info    Boubacar Mathews MD Family Medicine Schedule an appointment as soon as possible for a visit   [address] [place] [address]  [phone]      [facility] - Emergency Dept Emergency Medicine  If symptoms worsen [address]  [facility] [address]  [phone]               Jim Isidro DO  08/18/24 0619

## 2024-09-01 ENCOUNTER — HOSPITAL ENCOUNTER (EMERGENCY)
Facility: HOSPITAL | Age: 79
Discharge: ANOTHER HOSPITAL | End: 2024-09-01
Attending: EMERGENCY MEDICINE
Payer: MEDICARE

## 2024-09-01 VITALS
OXYGEN SATURATION: 96 % | HEART RATE: 109 BPM | RESPIRATION RATE: 20 BRPM | HEIGHT: 68 IN | WEIGHT: 189 LBS | SYSTOLIC BLOOD PRESSURE: 137 MMHG | BODY MASS INDEX: 28.64 KG/M2 | TEMPERATURE: 99 F | DIASTOLIC BLOOD PRESSURE: 76 MMHG

## 2024-09-01 DIAGNOSIS — M25.522 LEFT ELBOW PAIN: ICD-10-CM

## 2024-09-01 DIAGNOSIS — R50.9 FEVER: ICD-10-CM

## 2024-09-01 LAB
ALBUMIN SERPL-MCNC: 3.1 G/DL (ref 3.4–4.8)
ALBUMIN/GLOB SERPL: 0.9 RATIO (ref 1.1–2)
ALP SERPL-CCNC: 55 UNIT/L (ref 40–150)
ALT SERPL-CCNC: 11 UNIT/L (ref 0–55)
AMORPH URATE CRY URNS QL MICRO: ABNORMAL /HPF
ANION GAP SERPL CALC-SCNC: 10 MEQ/L
AST SERPL-CCNC: 12 UNIT/L (ref 5–34)
BACTERIA #/AREA URNS AUTO: ABNORMAL /HPF
BASOPHILS # BLD AUTO: 0.02 X10(3)/MCL
BASOPHILS NFR BLD AUTO: 0.2 %
BILIRUB SERPL-MCNC: 1.2 MG/DL
BILIRUB UR QL STRIP.AUTO: NEGATIVE
BUN SERPL-MCNC: 21 MG/DL (ref 8.4–25.7)
CALCIUM SERPL-MCNC: 9 MG/DL (ref 8.8–10)
CHLORIDE SERPL-SCNC: 102 MMOL/L (ref 98–107)
CLARITY UR: CLEAR
CO2 SERPL-SCNC: 23 MMOL/L (ref 23–31)
COLOR UR AUTO: YELLOW
CREAT SERPL-MCNC: 1.36 MG/DL (ref 0.73–1.18)
CREAT/UREA NIT SERPL: 15
CRP SERPL-MCNC: 117.5 MG/L
EOSINOPHIL # BLD AUTO: 0.01 X10(3)/MCL (ref 0–0.9)
EOSINOPHIL NFR BLD AUTO: 0.1 %
ERYTHROCYTE [DISTWIDTH] IN BLOOD BY AUTOMATED COUNT: 12.6 % (ref 11.5–17)
ERYTHROCYTE [SEDIMENTATION RATE] IN BLOOD: 126 MM/HR (ref 0–15)
FLUAV AG UPPER RESP QL IA.RAPID: NOT DETECTED
FLUBV AG UPPER RESP QL IA.RAPID: NOT DETECTED
GFR SERPLBLD CREATININE-BSD FMLA CKD-EPI: 53 ML/MIN/1.73/M2
GLOBULIN SER-MCNC: 3.5 GM/DL (ref 2.4–3.5)
GLUCOSE SERPL-MCNC: 129 MG/DL (ref 82–115)
GLUCOSE UR QL STRIP: NEGATIVE
HCT VFR BLD AUTO: 31.3 % (ref 42–52)
HGB BLD-MCNC: 10.7 G/DL (ref 14–18)
HGB UR QL STRIP: ABNORMAL
IMM GRANULOCYTES # BLD AUTO: 0.05 X10(3)/MCL (ref 0–0.04)
IMM GRANULOCYTES NFR BLD AUTO: 0.5 %
KETONES UR QL STRIP: NEGATIVE
LACTATE SERPL-SCNC: 1.5 MMOL/L (ref 0.5–2.2)
LEUKOCYTE ESTERASE UR QL STRIP: NEGATIVE
LYMPHOCYTES # BLD AUTO: 1.15 X10(3)/MCL (ref 0.6–4.6)
LYMPHOCYTES NFR BLD AUTO: 10.5 %
MCH RBC QN AUTO: 31.6 PG (ref 27–31)
MCHC RBC AUTO-ENTMCNC: 34.2 G/DL (ref 33–36)
MCV RBC AUTO: 92.3 FL (ref 80–94)
MONOCYTES # BLD AUTO: 0.98 X10(3)/MCL (ref 0.1–1.3)
MONOCYTES NFR BLD AUTO: 9 %
MUCOUS THREADS URNS QL MICRO: ABNORMAL /LPF
NEUTROPHILS # BLD AUTO: 8.71 X10(3)/MCL (ref 2.1–9.2)
NEUTROPHILS NFR BLD AUTO: 79.7 %
NITRITE UR QL STRIP: NEGATIVE
PH UR STRIP: 5.5 [PH]
PLATELET # BLD AUTO: 170 X10(3)/MCL (ref 130–400)
PMV BLD AUTO: 9.7 FL (ref 7.4–10.4)
POTASSIUM SERPL-SCNC: 3.8 MMOL/L (ref 3.5–5.1)
PROT SERPL-MCNC: 6.6 GM/DL (ref 5.8–7.6)
PROT UR QL STRIP: ABNORMAL
RBC # BLD AUTO: 3.39 X10(6)/MCL (ref 4.7–6.1)
RBC #/AREA URNS AUTO: ABNORMAL /HPF
SARS-COV-2 RNA RESP QL NAA+PROBE: NOT DETECTED
SODIUM SERPL-SCNC: 135 MMOL/L (ref 136–145)
SP GR UR STRIP.AUTO: 1.01 (ref 1–1.03)
SQUAMOUS #/AREA URNS AUTO: ABNORMAL /HPF
URATE SERPL-MCNC: 9 MG/DL (ref 3.5–7.2)
UROBILINOGEN UR STRIP-ACNC: 0.2
WBC # BLD AUTO: 10.92 X10(3)/MCL (ref 4.5–11.5)
WBC #/AREA URNS AUTO: ABNORMAL /HPF

## 2024-09-01 PROCEDURE — 0240U COVID/FLU A&B PCR: CPT

## 2024-09-01 PROCEDURE — 96360 HYDRATION IV INFUSION INIT: CPT

## 2024-09-01 PROCEDURE — 80053 COMPREHEN METABOLIC PANEL: CPT

## 2024-09-01 PROCEDURE — 63600175 PHARM REV CODE 636 W HCPCS

## 2024-09-01 PROCEDURE — 85652 RBC SED RATE AUTOMATED: CPT

## 2024-09-01 PROCEDURE — 96372 THER/PROPH/DIAG INJ SC/IM: CPT

## 2024-09-01 PROCEDURE — 84550 ASSAY OF BLOOD/URIC ACID: CPT

## 2024-09-01 PROCEDURE — 83605 ASSAY OF LACTIC ACID: CPT

## 2024-09-01 PROCEDURE — 81001 URINALYSIS AUTO W/SCOPE: CPT

## 2024-09-01 PROCEDURE — 25000003 PHARM REV CODE 250

## 2024-09-01 PROCEDURE — 86140 C-REACTIVE PROTEIN: CPT

## 2024-09-01 PROCEDURE — 99285 EMERGENCY DEPT VISIT HI MDM: CPT | Mod: 25

## 2024-09-01 PROCEDURE — 87040 BLOOD CULTURE FOR BACTERIA: CPT

## 2024-09-01 PROCEDURE — 85025 COMPLETE CBC W/AUTO DIFF WBC: CPT

## 2024-09-01 RX ORDER — KETOROLAC TROMETHAMINE 30 MG/ML
15 INJECTION, SOLUTION INTRAMUSCULAR; INTRAVENOUS
Status: COMPLETED | OUTPATIENT
Start: 2024-09-01 | End: 2024-09-01

## 2024-09-01 RX ADMIN — KETOROLAC TROMETHAMINE 15 MG: 30 INJECTION, SOLUTION INTRAMUSCULAR at 12:09

## 2024-09-01 RX ADMIN — SODIUM CHLORIDE 1000 ML: 9 INJECTION, SOLUTION INTRAVENOUS at 01:09

## 2024-09-01 NOTE — ED PROVIDER NOTES
Encounter Date: 9/1/2024       History     Chief Complaint   Patient presents with    Joint Swelling     Pt c/o pain and swelling to left foot, left arm and elbow, and right arm.      See mdm    The history is provided by the patient.     Review of patient's allergies indicates:  No Known Allergies  Past Medical History:   Diagnosis Date    Anemia, unspecified     Carotid artery stenosis     Gastro-esophageal reflux disease without esophagitis     Peripheral artery disease     Stroke      Past Surgical History:   Procedure Laterality Date    ARTHROPLASTY OF HIP BY ANTERIOR APPROACH Left 5/4/2022    Procedure: ARTHROPLASTY, HIP, TOTAL, ANTERIOR APPROACH;  Surgeon: Zach Lemos MD;  Location: [facility];  Service: Orthopedics;  Laterality: Left;    BACK SURGERY      CAROTID ENDARTERECTOMY Left 12/03/2015    COLONOSCOPY N/A 03/26/2018    CORONARY STENT PLACEMENT N/A     x 2    ESOPHAGOGASTRODUODENOSCOPY  03/26/2018    INTRAVASCULAR ULTRASOUND, CORONARY N/A 5/23/2024    Procedure: Ultrasound-coronary;  Surgeon: Shantal Mulligan MD;  Location: [facility] CATH LAB;  Service: Cardiology;  Laterality: N/A;    LEFT HEART CATHETERIZATION Left 5/23/2024    Procedure: Left heart cath;  Surgeon: Shantal Mulligan MD;  Location: [facility] CATH LAB;  Service: Cardiology;  Laterality: Left;  LHC +/- PCI    PENILE PROSTHESIS IMPLANT N/A     ROTATOR CUFF REPAIR Right     STENT, DRUG ELUTING, SINGLE VESSEL, CORONARY  5/23/2024    Procedure: Stent, Drug Eluting, Single Vessel, Coronary;  Surgeon: Shantal Mulligan MD;  Location: [facility] CATH LAB;  Service: Cardiology;;    TOTAL KNEE ARTHROPLASTY Right 10/18/2017     Family History   Problem Relation Name Age of Onset    Heart disease Mother      Aneurysm Mother      Heart attack Father       Social History     Tobacco Use    Smoking status: Former     Types: Cigarettes    Smokeless tobacco: Former   Substance Use Topics    Alcohol use: Never    Drug use: Never     Review of Systems    Musculoskeletal:         Left hand, bilateral elbows, and left foot pain and swelling x2 days   All other systems reviewed and are negative.      Physical Exam     Initial Vitals [09/01/24 1124]   BP Pulse Resp Temp SpO2   126/63 (!) 123 20 (!) 101.2 °F (38.4 °C) 96 %      MAP       --         Physical Exam    Nursing note and vitals reviewed.  Constitutional: He is not diaphoretic. He is Obese . He has a sickly appearance. No distress.   HENT:   Head: Normocephalic.   Right Ear: External ear normal.   Left Ear: External ear normal.   Nose: Nose normal.   Mouth/Throat: Oropharynx is clear and moist.   Eyes: EOM are normal. Pupils are equal, round, and reactive to light.   Neck: Neck supple.   Cardiovascular:  Regular rhythm.           Pulmonary/Chest: No respiratory distress. He has no wheezes. He has no rhonchi. He has no rales. He exhibits no tenderness.   Abdominal: Abdomen is soft. Bowel sounds are normal.   Musculoskeletal:      Right elbow: Swelling present. No deformity. Decreased range of motion. Tenderness present in olecranon process.      Left elbow: Swelling present. No deformity. Decreased range of motion. Tenderness present in olecranon process.      Left hand: Swelling and tenderness present. Decreased range of motion.      Cervical back: Neck supple.      Left foot: Decreased range of motion. Swelling and tenderness present. No deformity.      Comments: Swelling and TTP to left 5th finger. Podagra noted to lateral great toe of left foot. All other joints normal     Neurological: He is alert and oriented to person, place, and time. GCS score is 15. GCS eye subscore is 4. GCS verbal subscore is 5. GCS motor subscore is 6.   Skin: Skin is warm and dry. Capillary refill takes less than 2 seconds.   Psychiatric: He has a normal mood and affect.         ED Course   Procedures  Labs Reviewed   COMPREHENSIVE METABOLIC PANEL - Abnormal       Result Value    Sodium 135 (*)     Potassium 3.8      Chloride 102       CO2 23      Glucose 129 (*)     Blood Urea Nitrogen 21.0      Creatinine 1.36 (*)     Calcium 9.0      Protein Total 6.6      Albumin 3.1 (*)     Globulin 3.5      Albumin/Globulin Ratio 0.9 (*)     Bilirubin Total 1.2      ALP 55      ALT 11      AST 12      eGFR 53      Anion Gap 10.0      BUN/Creatinine Ratio 15     SEDIMENTATION RATE, AUTOMATED - Abnormal    Sed Rate 126 (*)    C-REACTIVE PROTEIN - Abnormal    .50 (*)    CBC WITH DIFFERENTIAL - Abnormal    WBC 10.92      RBC 3.39 (*)     Hgb 10.7 (*)     Hct 31.3 (*)     MCV 92.3      MCH 31.6 (*)     MCHC 34.2      RDW 12.6      Platelet 170      MPV 9.7      Neut % 79.7      Lymph % 10.5      Mono % 9.0      Eos % 0.1      Basophil % 0.2      Lymph # 1.15      Neut # 8.71      Mono # 0.98      Eos # 0.01      Baso # 0.02      IG# 0.05 (*)     IG% 0.5     URINALYSIS, REFLEX TO URINE CULTURE - Abnormal    Color, UA Yellow      Appearance, UA Clear      Specific Gravity, UA 1.015      pH, UA 5.5      Protein, UA Trace (*)     Glucose, UA Negative      Ketones, UA Negative      Blood, UA Trace-Lysed (*)     Bilirubin, UA Negative      Urobilinogen, UA 0.2      Nitrites, UA Negative      Leukocyte Esterase, UA Negative     URINALYSIS, MICROSCOPIC - Abnormal    Bacteria, UA None Seen      Mucous, UA Trace (*)     Amporphous Urate Crystals, UA Rare (*)     RBC, UA 0-5      WBC, UA 0-2      Squamous Epithelial Cells, UA Few (*)    LACTIC ACID, PLASMA - Normal    Lactic Acid Level 1.5     BLOOD CULTURE [facility]   BLOOD CULTURE [facility]   CBC W/ AUTO DIFFERENTIAL    Narrative:     The following orders were created for panel order CBC auto differential.  Procedure                               Abnormality         Status                     ---------                               -----------         ------                     CBC with Differential[[id number]]       Abnormal            Final result                 Please view results for these tests on the individual  orders.   COVID/FLU A&B PCR          Imaging Results              X-Ray Chest PA And Lateral (Final result)  Result time 09/01/24 14:02:08      Final result by Jose Parrish MD (09/01/24 14:02:08)                   Impression:      No acute cardiopulmonary process.      Electronically signed by: Jose Parrish MD  Date:    09/01/2024  Time:    14:02               Narrative:    EXAMINATION:  Chest one view    CLINICAL HISTORY:  Fever    COMPARISON:  04/16/2024    FINDINGS:  Lung volumes are low.  Cardiac silhouette is normal in size.  Central vessels appear normal.  There is no confluent airspace disease or consolidation.  Increased interstitial markings in the lung bases, similar to previous.                                       X-Ray Elbow Complete Left (Final result)  Result time 09/01/24 12:53:48      Final result by Jose Parrish MD (09/01/24 12:53:48)                   Impression:      No acute osseous finding    Nonspecific soft tissue swelling of the posterior elbow overlying the olecranon, possibly bursitis.      Electronically signed by: Jose Parrish MD  Date:    09/01/2024  Time:    12:53               Narrative:    EXAMINATION:  Left elbow four views    CLINICAL HISTORY:  Pain    COMPARISON:  None    FINDINGS:  No fracture subluxation.  Small osteophytes suggesting mild degenerative changes.  No significant joint space narrowing.  No fracture seen.  No joint effusion.  There is soft tissue swelling of the posterior elbow overlying the olecranon.                                       X-Ray Elbow Complete Right (Final result)  Result time 09/01/24 12:52:04      Final result by Jose Parrish MD (09/01/24 12:52:04)                   Impression:      No acute osseous finding.      Electronically signed by: Jose Parrish MD  Date:    09/01/2024  Time:    12:52               Narrative:    EXAMINATION:  Right elbow three views    CLINICAL HISTORY:  Pain    COMPARISON:  09/15/2016    FINDINGS:  No  fracture subluxation seen.  The joint spaces are maintained rib changes.  No focal soft tissue swelling.  No evidence for joint effusion.                                       Medications   ketorolac injection 15 mg (15 mg Intramuscular Given 9/1/24 1219)   sodium chloride 0.9% bolus 1,000 mL 1,000 mL (0 mLs Intravenous Stopped 9/1/24 1454)     Medical Decision Making  79 year old male presents to the ED with complaints of pain to left hand, bilateral elbows, and left foot. The patient states that pain started two days ago w/ no injuries or falls. The patient denies trying any medications at home. The patient does admit to a hx of gout and per chart review, patient was seen in ED for similar symptoms and d/c home with oral steroids. The patient does return today with worst symptoms with fever. The patient denies any abdominal pain, dizziness, weakness, cough, or SOB. The patient denies any daily medications taken for gout.    Amount and/or Complexity of Data Reviewed  Labs: ordered.  Radiology: ordered.    Risk  Prescription drug management.               ED Course as of 09/01/24 1735   Sun Sep 01, 2024   1215 Patient evaluated. Patient will have labs drawn with XR of Bilateral elbow due to fever with new swelling of bilateral elbow. The patient will be treated with toradol for symptoms.    [DL]   1329 Discussed patient with Dr. Nunez. Patient Left elbow XR suspicious for bursitis. Patient ESR and CRP elevated. Lactic and WBC WNL. Patient will have chest XR and Urinalysis to assess for any other causes of fever and tachycardia. [DL]   1729 Dr. Nunez did talk to Dr. Eugene LIU at [facility] and is accepting patient.  [DL]      ED Course User Index  [DL] Sai Arevalo NP                           Clinical Impression:  Final diagnoses:  [M25.522] Left elbow pain  [R50.9] Fever          ED Disposition Condition    Transfer to Another Facility Stable                Sai Arevalo NP  09/01/24 1739

## 2024-09-07 LAB
BACTERIA BLD CULT: NORMAL
BACTERIA BLD CULT: NORMAL

## 2025-06-09 ENCOUNTER — HOSPITAL ENCOUNTER (OUTPATIENT)
Dept: RADIOLOGY | Facility: HOSPITAL | Age: 80
Discharge: HOME | End: 2025-06-09
Attending: FAMILY MEDICINE
Payer: MEDICARE

## 2025-06-09 DIAGNOSIS — R07.9 CHEST PAIN, UNSPECIFIED: ICD-10-CM

## 2025-06-09 PROCEDURE — 71046 X-RAY EXAM CHEST 2 VIEWS: CPT | Mod: TC

## (undated) DEVICE — SYS WASTE FLD DISPOSAL 1400ML

## (undated) DEVICE — TOGA FLYTE PEEL AWAY XLARGE

## (undated) DEVICE — SUT COAT VICRYL 1 CT CR 18

## (undated) DEVICE — CATH EMERGE MR 15 X 2.50

## (undated) DEVICE — SUT CTD VICRYL CT-1 27

## (undated) DEVICE — PILLOW ABDUCTION FOAM MED

## (undated) DEVICE — GLOVE PROTEXIS BLUE LATEX 8.5

## (undated) DEVICE — WIRE GUIDE INQWIRE STR 180CM

## (undated) DEVICE — DRAPE SPLIT SHEET 4X40IN

## (undated) DEVICE — SOL IRRI STRL WATER 1000ML

## (undated) DEVICE — KIT OTRHOPEDIC FRACTURE

## (undated) DEVICE — HANDPIECE BEND-A-BEAM ABC 6IN

## (undated) DEVICE — SUT STRATAFIX 2-0 30CM

## (undated) DEVICE — DRAPE MOBILE C-ARM

## (undated) DEVICE — GLOVE PROTEXIS BLUE LATEX 7.5

## (undated) DEVICE — GLOVE PROTEXIS NEOPRN BRN SZ7

## (undated) DEVICE — GLOVE PROTEXIS HYDROGEL SZ7.5

## (undated) DEVICE — GUIDE LAUNCHER 6FR EBU 3.0

## (undated) DEVICE — KIT MANIFOLD LOW PRESS TUBING

## (undated) DEVICE — SUT VICRYL 2-0 J589H 27IN

## (undated) DEVICE — CATH EAGLE EYE PLATINUM

## (undated) DEVICE — DRESSING AQUACEL AG ADV 3.5X6

## (undated) DEVICE — ELECTRODE ELECSURG BLU 10FT

## (undated) DEVICE — SUPPORT ULNA NERVE PROTECTOR

## (undated) DEVICE — KIT GLIDESHEATH SLEND 6FR 10CM

## (undated) DEVICE — GLOVE PROTEXIS BLUE LATEX 7

## (undated) DEVICE — PAD DEFIB CADENCE ADULT R2

## (undated) DEVICE — GLOVE 7.5 PROTEXIS PI MICRO

## (undated) DEVICE — CATH GUID LNCH AR1 5F 100CM

## (undated) DEVICE — KIT SURGIFLO HEMOSTATIC MATRIX

## (undated) DEVICE — CATH EMERGE MR 30 X 3.00

## (undated) DEVICE — PACK TOTAL JOINT TJLSB 659 SH

## (undated) DEVICE — BAND TR WITH INFLATOR

## (undated) DEVICE — STOPCOCK 3-WAY

## (undated) DEVICE — GLOVE PROTEXIS HYDROGEL SZ8.5

## (undated) DEVICE — DEVICE BASIXCOMPAK INFL 20ML

## (undated) DEVICE — CATH NC EMERGE MR 3.25X12MM

## (undated) DEVICE — CATH NC EMERGE MR 3.5X15MM

## (undated) DEVICE — KIT PULSAVAC PLUS HIP

## (undated) DEVICE — DURAPREP SURG SCRUB 26ML

## (undated) DEVICE — GUIDEWIRE RUNTHROUGH NS 300CM

## (undated) DEVICE — PACK OR CLEAN UP COMBO SIZE 2

## (undated) DEVICE — CANNULA DUAL CO2/O2 NASAL 7FT

## (undated) DEVICE — CATH IMPULSE 5F 100CM FR4

## (undated) DEVICE — ELECTRODE EXTENDED BLADE

## (undated) DEVICE — SOL IRR SOD CHL .9% POUR

## (undated) DEVICE — GUIDEWIRE RUNTHROUGH NS 180CM

## (undated) DEVICE — CONTRAST ISOVUE 370 500ML MULT

## (undated) DEVICE — CATH NC EMERGE MR 3.5X20MM

## (undated) DEVICE — DRAPE IOBAN 2 STERI

## (undated) DEVICE — TUBING HP AIRLSS ROT ADPT 30IN

## (undated) DEVICE — GUIDE LAUNCHER 6FR EBU 3.5

## (undated) DEVICE — Device

## (undated) DEVICE — DRESSING TRANS 4X4 TEGADERM

## (undated) DEVICE — KIT HAND CONTROL HIGH PRESSUR

## (undated) DEVICE — COVER PROBE US 5.5X58L NON LTX

## (undated) DEVICE — CATH FL 3.5 5FR

## (undated) DEVICE — VALVE CONTROL COPILOT

## (undated) DEVICE — CATH JACKY RADIAL 5FR 100CM

## (undated) DEVICE — ADHESIVE DERMABOND ADVANCED

## (undated) DEVICE — SYR 50CC LL

## (undated) DEVICE — GLOVE PROTEXIS LTX 6.5